# Patient Record
Sex: FEMALE | Race: WHITE | NOT HISPANIC OR LATINO | Employment: OTHER | URBAN - METROPOLITAN AREA
[De-identification: names, ages, dates, MRNs, and addresses within clinical notes are randomized per-mention and may not be internally consistent; named-entity substitution may affect disease eponyms.]

---

## 2020-10-03 PROBLEM — T17.908A ASPIRATION INTO AIRWAY: Status: ACTIVE | Noted: 2020-10-03

## 2020-10-03 PROBLEM — K56.609 SBO (SMALL BOWEL OBSTRUCTION) (HCC): Status: ACTIVE | Noted: 2020-10-03

## 2020-10-15 ENCOUNTER — APPOINTMENT (OUTPATIENT)
Dept: RADIOLOGY | Facility: MEDICAL CENTER | Age: 85
DRG: 380 | End: 2020-10-15
Attending: INTERNAL MEDICINE
Payer: MEDICARE

## 2020-10-15 ENCOUNTER — HOSPITAL ENCOUNTER (OUTPATIENT)
Dept: RADIOLOGY | Facility: MEDICAL CENTER | Age: 85
End: 2020-10-15
Payer: MEDICARE

## 2020-10-15 ENCOUNTER — HOSPITAL ENCOUNTER (INPATIENT)
Facility: MEDICAL CENTER | Age: 85
LOS: 8 days | DRG: 380 | End: 2020-10-23
Attending: INTERNAL MEDICINE | Admitting: INTERNAL MEDICINE
Payer: MEDICARE

## 2020-10-15 DIAGNOSIS — K31.89 GASTRIC MASS: ICD-10-CM

## 2020-10-15 DIAGNOSIS — K31.1 GASTRIC OUTFLOW OBSTRUCTION: ICD-10-CM

## 2020-10-15 PROBLEM — K44.9 HIATAL HERNIA: Status: ACTIVE | Noted: 2020-10-15

## 2020-10-15 PROBLEM — T17.908A ASPIRATION INTO AIRWAY: Status: RESOLVED | Noted: 2020-10-03 | Resolved: 2020-10-15

## 2020-10-15 PROBLEM — K56.609 SBO (SMALL BOWEL OBSTRUCTION) (HCC): Status: RESOLVED | Noted: 2020-10-03 | Resolved: 2020-10-15

## 2020-10-15 PROBLEM — J69.0 ASPIRATION PNEUMONIA (HCC): Status: ACTIVE | Noted: 2020-10-15

## 2020-10-15 LAB
COVID ORDER STATUS COVID19: NORMAL
GLUCOSE BLD-MCNC: 119 MG/DL (ref 65–99)
MAGNESIUM SERPL-MCNC: 1.8 MG/DL (ref 1.5–2.5)

## 2020-10-15 PROCEDURE — 770001 HCHG ROOM/CARE - MED/SURG/GYN PRIV*

## 2020-10-15 PROCEDURE — U0003 INFECTIOUS AGENT DETECTION BY NUCLEIC ACID (DNA OR RNA); SEVERE ACUTE RESPIRATORY SYNDROME CORONAVIRUS 2 (SARS-COV-2) (CORONAVIRUS DISEASE [COVID-19]), AMPLIFIED PROBE TECHNIQUE, MAKING USE OF HIGH THROUGHPUT TECHNOLOGIES AS DESCRIBED BY CMS-2020-01-R: HCPCS

## 2020-10-15 PROCEDURE — 700111 HCHG RX REV CODE 636 W/ 250 OVERRIDE (IP): Performed by: INTERNAL MEDICINE

## 2020-10-15 PROCEDURE — A9270 NON-COVERED ITEM OR SERVICE: HCPCS | Performed by: HOSPITALIST

## 2020-10-15 PROCEDURE — C9113 INJ PANTOPRAZOLE SODIUM, VIA: HCPCS | Performed by: HOSPITALIST

## 2020-10-15 PROCEDURE — 700102 HCHG RX REV CODE 250 W/ 637 OVERRIDE(OP): Performed by: HOSPITALIST

## 2020-10-15 PROCEDURE — 700111 HCHG RX REV CODE 636 W/ 250 OVERRIDE (IP): Performed by: HOSPITALIST

## 2020-10-15 PROCEDURE — 3E02340 INTRODUCTION OF INFLUENZA VACCINE INTO MUSCLE, PERCUTANEOUS APPROACH: ICD-10-PCS | Performed by: INTERNAL MEDICINE

## 2020-10-15 PROCEDURE — 700101 HCHG RX REV CODE 250: Performed by: SURGERY

## 2020-10-15 PROCEDURE — 700101 HCHG RX REV CODE 250: Performed by: HOSPITALIST

## 2020-10-15 PROCEDURE — 82962 GLUCOSE BLOOD TEST: CPT

## 2020-10-15 PROCEDURE — 83735 ASSAY OF MAGNESIUM: CPT | Mod: 91

## 2020-10-15 PROCEDURE — 90662 IIV NO PRSV INCREASED AG IM: CPT | Performed by: INTERNAL MEDICINE

## 2020-10-15 PROCEDURE — 99223 1ST HOSP IP/OBS HIGH 75: CPT | Mod: AI | Performed by: HOSPITALIST

## 2020-10-15 PROCEDURE — 36415 COLL VENOUS BLD VENIPUNCTURE: CPT

## 2020-10-15 PROCEDURE — 90471 IMMUNIZATION ADMIN: CPT

## 2020-10-15 RX ORDER — LEVOTHYROXINE SODIUM 112 UG/1
112 TABLET ORAL
COMMUNITY

## 2020-10-15 RX ORDER — DEXTROSE MONOHYDRATE, SODIUM CHLORIDE, AND POTASSIUM CHLORIDE 50; 1.49; 4.5 G/1000ML; G/1000ML; G/1000ML
INJECTION, SOLUTION INTRAVENOUS CONTINUOUS
Status: DISPENSED | OUTPATIENT
Start: 2020-10-15 | End: 2020-10-16

## 2020-10-15 RX ORDER — LOVASTATIN 20 MG/1
20 TABLET ORAL NIGHTLY
COMMUNITY

## 2020-10-15 RX ORDER — ONDANSETRON 4 MG/1
4 TABLET, ORALLY DISINTEGRATING ORAL EVERY 4 HOURS PRN
Status: DISCONTINUED | OUTPATIENT
Start: 2020-10-15 | End: 2020-10-23 | Stop reason: HOSPADM

## 2020-10-15 RX ORDER — ACETAMINOPHEN 325 MG/1
650 TABLET ORAL EVERY 6 HOURS PRN
Status: DISCONTINUED | OUTPATIENT
Start: 2020-10-15 | End: 2020-10-23 | Stop reason: HOSPADM

## 2020-10-15 RX ORDER — POLYETHYLENE GLYCOL 3350 17 G/17G
1 POWDER, FOR SOLUTION ORAL
Status: DISCONTINUED | OUTPATIENT
Start: 2020-10-15 | End: 2020-10-23 | Stop reason: HOSPADM

## 2020-10-15 RX ORDER — ONDANSETRON 2 MG/ML
4 INJECTION INTRAMUSCULAR; INTRAVENOUS EVERY 4 HOURS PRN
Status: DISCONTINUED | OUTPATIENT
Start: 2020-10-15 | End: 2020-10-23 | Stop reason: HOSPADM

## 2020-10-15 RX ORDER — NIFEDIPINE 30 MG/1
30 TABLET, EXTENDED RELEASE ORAL 2 TIMES DAILY
Status: ON HOLD | COMMUNITY
End: 2020-10-23

## 2020-10-15 RX ORDER — OMEPRAZOLE 20 MG/1
40 CAPSULE, DELAYED RELEASE ORAL DAILY
Status: ON HOLD | COMMUNITY
End: 2020-10-23

## 2020-10-15 RX ORDER — DEXTROSE MONOHYDRATE 25 G/50ML
50 INJECTION, SOLUTION INTRAVENOUS
Status: DISCONTINUED | OUTPATIENT
Start: 2020-10-15 | End: 2020-10-23 | Stop reason: HOSPADM

## 2020-10-15 RX ORDER — AMOXICILLIN 250 MG
2 CAPSULE ORAL 2 TIMES DAILY
Status: DISCONTINUED | OUTPATIENT
Start: 2020-10-15 | End: 2020-10-23 | Stop reason: HOSPADM

## 2020-10-15 RX ORDER — FUROSEMIDE 10 MG/ML
10 INJECTION INTRAMUSCULAR; INTRAVENOUS
Status: DISCONTINUED | OUTPATIENT
Start: 2020-10-16 | End: 2020-10-16

## 2020-10-15 RX ORDER — NYSTATIN 100000 [USP'U]/G
POWDER TOPICAL 2 TIMES DAILY
Status: DISCONTINUED | OUTPATIENT
Start: 2020-10-15 | End: 2020-10-23 | Stop reason: HOSPADM

## 2020-10-15 RX ORDER — LEVOTHYROXINE SODIUM 0.05 MG/1
100 TABLET ORAL
Status: DISCONTINUED | OUTPATIENT
Start: 2020-10-16 | End: 2020-10-23 | Stop reason: HOSPADM

## 2020-10-15 RX ORDER — BISACODYL 10 MG
10 SUPPOSITORY, RECTAL RECTAL
Status: DISCONTINUED | OUTPATIENT
Start: 2020-10-15 | End: 2020-10-23 | Stop reason: HOSPADM

## 2020-10-15 RX ORDER — SODIUM CHLORIDE 0.9 % (FLUSH) 0.9 %
10 SYRINGE (ML) INJECTION 2 TIMES DAILY
Status: DISCONTINUED | OUTPATIENT
Start: 2020-10-15 | End: 2020-10-23 | Stop reason: HOSPADM

## 2020-10-15 RX ORDER — GABAPENTIN 100 MG/1
200 CAPSULE ORAL EVERY EVENING
Status: DISCONTINUED | OUTPATIENT
Start: 2020-10-15 | End: 2020-10-23 | Stop reason: HOSPADM

## 2020-10-15 RX ORDER — PANTOPRAZOLE SODIUM 40 MG/10ML
40 INJECTION, POWDER, LYOPHILIZED, FOR SOLUTION INTRAVENOUS 2 TIMES DAILY
Status: DISCONTINUED | OUTPATIENT
Start: 2020-10-15 | End: 2020-10-23 | Stop reason: HOSPADM

## 2020-10-15 RX ADMIN — INFLUENZA A VIRUS A/MICHIGAN/45/2015 X-275 (H1N1) ANTIGEN (FORMALDEHYDE INACTIVATED), INFLUENZA A VIRUS A/SINGAPORE/INFIMH-16-0019/2016 IVR-186 (H3N2) ANTIGEN (FORMALDEHYDE INACTIVATED), INFLUENZA B VIRUS B/PHUKET/3073/2013 ANTIGEN (FORMALDEHYDE INACTIVATED), AND INFLUENZA B VIRUS B/MARYLAND/15/2016 BX-69A ANTIGEN (FORMALDEHYDE INACTIVATED) 0.7 ML: 60; 60; 60; 60 INJECTION, SUSPENSION INTRAMUSCULAR at 21:15

## 2020-10-15 RX ADMIN — DOCUSATE SODIUM 50 MG AND SENNOSIDES 8.6 MG 2 TABLET: 8.6; 5 TABLET, FILM COATED ORAL at 21:16

## 2020-10-15 RX ADMIN — NYSTATIN: 100000 POWDER TOPICAL at 21:17

## 2020-10-15 RX ADMIN — POTASSIUM CHLORIDE, DEXTROSE MONOHYDRATE AND SODIUM CHLORIDE: 150; 5; 450 INJECTION, SOLUTION INTRAVENOUS at 21:23

## 2020-10-15 RX ADMIN — PANTOPRAZOLE SODIUM 40 MG: 40 INJECTION, POWDER, LYOPHILIZED, FOR SOLUTION INTRAVENOUS at 21:16

## 2020-10-15 RX ADMIN — GABAPENTIN 200 MG: 100 CAPSULE ORAL at 21:17

## 2020-10-15 RX ADMIN — SODIUM CHLORIDE, PRESERVATIVE FREE 10 ML: 5 INJECTION INTRAVENOUS at 21:32

## 2020-10-15 ASSESSMENT — LIFESTYLE VARIABLES
TOTAL SCORE: 0
EVER FELT BAD OR GUILTY ABOUT YOUR DRINKING: NO
EVER HAD A DRINK FIRST THING IN THE MORNING TO STEADY YOUR NERVES TO GET RID OF A HANGOVER: NO
HAVE PEOPLE ANNOYED YOU BY CRITICIZING YOUR DRINKING: NO
HAVE YOU EVER FELT YOU SHOULD CUT DOWN ON YOUR DRINKING: NO
CONSUMPTION TOTAL: NEGATIVE
ALCOHOL_USE: NO
AVERAGE NUMBER OF DAYS PER WEEK YOU HAVE A DRINK CONTAINING ALCOHOL: 0
ON A TYPICAL DAY WHEN YOU DRINK ALCOHOL HOW MANY DRINKS DO YOU HAVE: 0
HOW MANY TIMES IN THE PAST YEAR HAVE YOU HAD 5 OR MORE DRINKS IN A DAY: 0

## 2020-10-15 ASSESSMENT — FIBROSIS 4 INDEX: FIB4 SCORE: 1.5

## 2020-10-15 ASSESSMENT — ENCOUNTER SYMPTOMS
BACK PAIN: 0
VOMITING: 0
PALPITATIONS: 0
MYALGIAS: 0
DOUBLE VISION: 0
BRUISES/BLEEDS EASILY: 0
DIZZINESS: 0
DEPRESSION: 0
CHILLS: 0
NECK PAIN: 0
PND: 0
CLAUDICATION: 0
NAUSEA: 0
HEADACHES: 0
BLURRED VISION: 0
COUGH: 0
FEVER: 0
HEMOPTYSIS: 0
HEARTBURN: 0

## 2020-10-15 ASSESSMENT — COGNITIVE AND FUNCTIONAL STATUS - GENERAL
MOVING FROM LYING ON BACK TO SITTING ON SIDE OF FLAT BED: A LOT
DRESSING REGULAR UPPER BODY CLOTHING: A LITTLE
DRESSING REGULAR LOWER BODY CLOTHING: A LITTLE
DAILY ACTIVITIY SCORE: 17
TOILETING: A LOT
EATING MEALS: A LITTLE
TURNING FROM BACK TO SIDE WHILE IN FLAT BAD: A LOT
STANDING UP FROM CHAIR USING ARMS: A LOT
WALKING IN HOSPITAL ROOM: A LOT
PERSONAL GROOMING: A LITTLE
SUGGESTED CMS G CODE MODIFIER DAILY ACTIVITY: CK
CLIMB 3 TO 5 STEPS WITH RAILING: A LOT
HELP NEEDED FOR BATHING: A LITTLE
MOBILITY SCORE: 12
SUGGESTED CMS G CODE MODIFIER MOBILITY: CL
MOVING TO AND FROM BED TO CHAIR: A LOT

## 2020-10-15 ASSESSMENT — PAIN DESCRIPTION - PAIN TYPE: TYPE: CHRONIC PAIN

## 2020-10-15 ASSESSMENT — PATIENT HEALTH QUESTIONNAIRE - PHQ9
1. LITTLE INTEREST OR PLEASURE IN DOING THINGS: NOT AT ALL
SUM OF ALL RESPONSES TO PHQ9 QUESTIONS 1 AND 2: 0
2. FEELING DOWN, DEPRESSED, IRRITABLE, OR HOPELESS: NOT AT ALL

## 2020-10-15 NOTE — PROGRESS NOTES
2 RN skin check completed with ANGELICA Zaman   Devices in place , central line, SCDs.  Skin assessed under devices:   Skin over bilateral ears intact   Skin over bilateral elbows intact   RLQ abdominal bruising   Redness in perineum, blanching   (R) thigh skin tear   Generalized fragile and flaky skin   Confirmed pressure ulcers found on N/A.  New potential pressure ulcers noted on N/A. Wound consult placed N/A.  The following interventions in place: will place on waffle overlay, q2h repositioning encouraged, educated on mobilization, purewick to be placed to help with incontinence

## 2020-10-15 NOTE — PROGRESS NOTES
Triage officer note    Transferring physician Dr. Deep Ramos    Transferring facility [facility]    Reason for transfer surgery consult    Linda Haley 85 y.o. female admitted to [facility] on September 27, 2020 and she underwent upper GI endoscopy.  She found to have possible gastric outlet obstruction and currently she has been receiving TPN and liquid diet.  She found to have possible hiatal hernia and outside provider would like to transfer this patient for surgery consult.      I discussed this case with Dr. Gisela fish who recommended to notify on-call surgeon on patient arrival.      Please notify surgery on patient arrival

## 2020-10-16 ENCOUNTER — APPOINTMENT (OUTPATIENT)
Dept: RADIOLOGY | Facility: MEDICAL CENTER | Age: 85
DRG: 380 | End: 2020-10-16
Attending: SURGERY
Payer: MEDICARE

## 2020-10-16 PROBLEM — E83.42 HYPOMAGNESEMIA: Status: ACTIVE | Noted: 2020-10-16

## 2020-10-16 PROBLEM — J90 RECURRENT LEFT PLEURAL EFFUSION: Status: ACTIVE | Noted: 2020-10-16

## 2020-10-16 LAB
ALBUMIN SERPL BCP-MCNC: 2.3 G/DL (ref 3.2–4.9)
ALBUMIN/GLOB SERPL: 0.7 G/DL
ALP SERPL-CCNC: 127 U/L (ref 30–99)
ALT SERPL-CCNC: 19 U/L (ref 2–50)
ANION GAP SERPL CALC-SCNC: 7 MMOL/L (ref 7–16)
AST SERPL-CCNC: 19 U/L (ref 12–45)
BILIRUB SERPL-MCNC: 0.5 MG/DL (ref 0.1–1.5)
BUN SERPL-MCNC: 25 MG/DL (ref 8–22)
CALCIUM SERPL-MCNC: 9.6 MG/DL (ref 8.5–10.5)
CHLORIDE SERPL-SCNC: 90 MMOL/L (ref 96–112)
CO2 SERPL-SCNC: 33 MMOL/L (ref 20–33)
CREAT SERPL-MCNC: 0.5 MG/DL (ref 0.5–1.4)
ERYTHROCYTE [DISTWIDTH] IN BLOOD BY AUTOMATED COUNT: 51.1 FL (ref 35.9–50)
GLOBULIN SER CALC-MCNC: 3.3 G/DL (ref 1.9–3.5)
GLUCOSE BLD-MCNC: 100 MG/DL (ref 65–99)
GLUCOSE BLD-MCNC: 126 MG/DL (ref 65–99)
GLUCOSE BLD-MCNC: 83 MG/DL (ref 65–99)
GLUCOSE BLD-MCNC: 96 MG/DL (ref 65–99)
GLUCOSE SERPL-MCNC: 108 MG/DL (ref 65–99)
HCT VFR BLD AUTO: 28.1 % (ref 37–47)
HGB BLD-MCNC: 8.9 G/DL (ref 12–16)
MCH RBC QN AUTO: 31.3 PG (ref 27–33)
MCHC RBC AUTO-ENTMCNC: 31.7 G/DL (ref 33.6–35)
MCV RBC AUTO: 98.9 FL (ref 81.4–97.8)
PLATELET # BLD AUTO: 263 K/UL (ref 164–446)
PMV BLD AUTO: 10.8 FL (ref 9–12.9)
POTASSIUM SERPL-SCNC: 3.8 MMOL/L (ref 3.6–5.5)
PROT SERPL-MCNC: 5.6 G/DL (ref 6–8.2)
RBC # BLD AUTO: 2.84 M/UL (ref 4.2–5.4)
SARS-COV-2 RNA RESP QL NAA+PROBE: NOTDETECTED
SODIUM SERPL-SCNC: 130 MMOL/L (ref 135–145)
SPECIMEN SOURCE: NORMAL
WBC # BLD AUTO: 7.3 K/UL (ref 4.8–10.8)

## 2020-10-16 PROCEDURE — C1751 CATH, INF, PER/CENT/MIDLINE: HCPCS

## 2020-10-16 PROCEDURE — C9113 INJ PANTOPRAZOLE SODIUM, VIA: HCPCS | Performed by: HOSPITALIST

## 2020-10-16 PROCEDURE — 94669 MECHANICAL CHEST WALL OSCILL: CPT

## 2020-10-16 PROCEDURE — 770001 HCHG ROOM/CARE - MED/SURG/GYN PRIV*

## 2020-10-16 PROCEDURE — 99233 SBSQ HOSP IP/OBS HIGH 50: CPT | Performed by: STUDENT IN AN ORGANIZED HEALTH CARE EDUCATION/TRAINING PROGRAM

## 2020-10-16 PROCEDURE — 82962 GLUCOSE BLOOD TEST: CPT

## 2020-10-16 PROCEDURE — 700101 HCHG RX REV CODE 250: Performed by: STUDENT IN AN ORGANIZED HEALTH CARE EDUCATION/TRAINING PROGRAM

## 2020-10-16 PROCEDURE — 74240 X-RAY XM UPR GI TRC 1CNTRST: CPT

## 2020-10-16 PROCEDURE — 700117 HCHG RX CONTRAST REV CODE 255: Performed by: SURGERY

## 2020-10-16 PROCEDURE — 80053 COMPREHEN METABOLIC PANEL: CPT

## 2020-10-16 PROCEDURE — 02HV33Z INSERTION OF INFUSION DEVICE INTO SUPERIOR VENA CAVA, PERCUTANEOUS APPROACH: ICD-10-PCS | Performed by: SURGERY

## 2020-10-16 PROCEDURE — 700101 HCHG RX REV CODE 250: Performed by: SURGERY

## 2020-10-16 PROCEDURE — B548ZZA ULTRASONOGRAPHY OF SUPERIOR VENA CAVA, GUIDANCE: ICD-10-PCS | Performed by: SURGERY

## 2020-10-16 PROCEDURE — 85027 COMPLETE CBC AUTOMATED: CPT

## 2020-10-16 PROCEDURE — 700111 HCHG RX REV CODE 636 W/ 250 OVERRIDE (IP): Performed by: HOSPITALIST

## 2020-10-16 PROCEDURE — 700111 HCHG RX REV CODE 636 W/ 250 OVERRIDE (IP): Performed by: STUDENT IN AN ORGANIZED HEALTH CARE EDUCATION/TRAINING PROGRAM

## 2020-10-16 PROCEDURE — 700102 HCHG RX REV CODE 250 W/ 637 OVERRIDE(OP): Performed by: HOSPITALIST

## 2020-10-16 PROCEDURE — 700105 HCHG RX REV CODE 258: Performed by: STUDENT IN AN ORGANIZED HEALTH CARE EDUCATION/TRAINING PROGRAM

## 2020-10-16 PROCEDURE — 700101 HCHG RX REV CODE 250: Performed by: HOSPITALIST

## 2020-10-16 PROCEDURE — A9270 NON-COVERED ITEM OR SERVICE: HCPCS | Performed by: HOSPITALIST

## 2020-10-16 PROCEDURE — 36415 COLL VENOUS BLD VENIPUNCTURE: CPT

## 2020-10-16 RX ORDER — FUROSEMIDE 10 MG/ML
40 INJECTION INTRAMUSCULAR; INTRAVENOUS ONCE
Status: COMPLETED | OUTPATIENT
Start: 2020-10-16 | End: 2020-10-16

## 2020-10-16 RX ORDER — MAGNESIUM SULFATE HEPTAHYDRATE 40 MG/ML
2 INJECTION, SOLUTION INTRAVENOUS ONCE
Status: COMPLETED | OUTPATIENT
Start: 2020-10-16 | End: 2020-10-16

## 2020-10-16 RX ADMIN — PANTOPRAZOLE SODIUM 40 MG: 40 INJECTION, POWDER, LYOPHILIZED, FOR SOLUTION INTRAVENOUS at 18:15

## 2020-10-16 RX ADMIN — NYSTATIN: 100000 POWDER TOPICAL at 18:00

## 2020-10-16 RX ADMIN — ENOXAPARIN SODIUM 40 MG: 40 INJECTION SUBCUTANEOUS at 05:41

## 2020-10-16 RX ADMIN — GABAPENTIN 200 MG: 100 CAPSULE ORAL at 18:00

## 2020-10-16 RX ADMIN — LEVOTHYROXINE SODIUM 100 MCG: 0.05 TABLET ORAL at 05:40

## 2020-10-16 RX ADMIN — DOCUSATE SODIUM 50 MG AND SENNOSIDES 8.6 MG 2 TABLET: 8.6; 5 TABLET, FILM COATED ORAL at 05:40

## 2020-10-16 RX ADMIN — NYSTATIN: 100000 POWDER TOPICAL at 05:41

## 2020-10-16 RX ADMIN — POTASSIUM CHLORIDE, DEXTROSE MONOHYDRATE AND SODIUM CHLORIDE: 150; 5; 450 INJECTION, SOLUTION INTRAVENOUS at 05:30

## 2020-10-16 RX ADMIN — SODIUM CHLORIDE, PRESERVATIVE FREE 10 ML: 5 INJECTION INTRAVENOUS at 18:09

## 2020-10-16 RX ADMIN — PANTOPRAZOLE SODIUM 40 MG: 40 INJECTION, POWDER, LYOPHILIZED, FOR SOLUTION INTRAVENOUS at 05:26

## 2020-10-16 RX ADMIN — CALCIUM GLUCONATE: 98 INJECTION, SOLUTION INTRAVENOUS at 20:18

## 2020-10-16 RX ADMIN — FUROSEMIDE 40 MG: 10 INJECTION, SOLUTION INTRAMUSCULAR; INTRAVENOUS at 20:18

## 2020-10-16 RX ADMIN — IOHEXOL 200 ML: 240 INJECTION, SOLUTION INTRATHECAL; INTRAVASCULAR; INTRAVENOUS; ORAL at 11:10

## 2020-10-16 RX ADMIN — MAGNESIUM SULFATE 2 G: 2 INJECTION INTRAVENOUS at 18:00

## 2020-10-16 RX ADMIN — FUROSEMIDE 10 MG: 10 INJECTION, SOLUTION INTRAMUSCULAR; INTRAVENOUS at 05:28

## 2020-10-16 RX ADMIN — SODIUM CHLORIDE, PRESERVATIVE FREE 10 ML: 5 INJECTION INTRAVENOUS at 05:53

## 2020-10-16 ASSESSMENT — ENCOUNTER SYMPTOMS
VOMITING: 1
CONSTIPATION: 1
HEADACHES: 0
COUGH: 1
DEPRESSION: 0
NECK PAIN: 0
SHORTNESS OF BREATH: 1
PALPITATIONS: 0
BLURRED VISION: 0
CHILLS: 0
DOUBLE VISION: 0
FEVER: 0
MYALGIAS: 0
DIZZINESS: 0

## 2020-10-16 ASSESSMENT — COPD QUESTIONNAIRES
DURING THE PAST 4 WEEKS HOW MUCH DID YOU FEEL SHORT OF BREATH: SOME OF THE TIME
HAVE YOU SMOKED AT LEAST 100 CIGARETTES IN YOUR ENTIRE LIFE: NO/DON'T KNOW
DO YOU EVER COUGH UP ANY MUCUS OR PHLEGM?: YES, A FEW DAYS A WEEK OR MONTH
COPD SCREENING SCORE: 5

## 2020-10-16 ASSESSMENT — PAIN DESCRIPTION - PAIN TYPE
TYPE: CHRONIC PAIN

## 2020-10-16 ASSESSMENT — LIFESTYLE VARIABLES: EVER_SMOKED: NEVER

## 2020-10-16 NOTE — ASSESSMENT & PLAN NOTE
Chronic normocytic anemia  Iron studies suggestive for anemia of chronic inflammation vs anemia from malignancy  Stable VS  Hemoglobin 7.6->7.3->7.5  Possible slow GI bleed  10/19 EGD w/coffee ground   Transfuse if hemoglobin lower than 7  Labs on AM

## 2020-10-16 NOTE — ASSESSMENT & PLAN NOTE
"-  GI onboard; appreciated.  10/19 EGD showed large amount of retained coffee-ground like debris and solid.  Repeat EGD 10/21 showed esophageal reflux, \"large shallow ulcer involving at least 50% hernia sac, no active bleeding, large adherent mucoid clot.\"    - Gastric emptying study planned for today, 10/22.    - Per GI Carafate slurry 1 g 4 times daily x2 weeks minimum.  Continue Protonix 40 mg p.o. twice daily.  Further recs pending gastric emptying study.  - Follow up biopsies.   - Has not needed TPN since 10/17.  Will remove PICC if no further need.   "

## 2020-10-16 NOTE — H&P
Hospital Medicine History & Physical Note    Date of Service  10/15/2020    Primary Care Physician  Davie Mena M.D.    Consultants  General surgery    Code Status  DNAR/DNI    Chief Complaint  Hiatal hernia    History of Presenting Illness  85 y.o. female who presented 10/15/2020 with past medical history of respiratory failure, aspiration pneumonia, hiatal hernia, patient was admitted at [facility] at the end of September for above condition, patient was found to have probably gastric outlet obstruction secondary to hiatal hernia, patient was evaluated by general surgery patient had EGD that did not show acute findings, patient had upper GI studies that showed low emptying of the hiatal hernia sac and subsequent pooling of contrast within the stomach but follow-through was not completed, patient has been started on TPN she is tolerating clear liquid diet, patient is having bowel movements and passing flatus, apparently [facility] team recommended transfer to [facility] for further evaluation, general surgery has been consulted here and will evaluate patient today, when I saw the patient she is alert oriented follows commands she is in no distress, she denies any nausea vomiting no abdominal pain no diarrhea no constipation, she is tolerating clear liquid diet, she denies any shortness of breath chest pain palpitation focal weakness numbness tingling fever chills, patient will be admitted and will continue monitoring her overnight, will follow up with general surgery recommendations and will need GI consult in a.m., patient expressed understanding of her plan of care and agree with either question have been answered.    Review of Systems  Review of Systems   Constitutional: Negative for chills and fever.   HENT: Negative for congestion and nosebleeds.    Eyes: Negative for blurred vision and double vision.   Respiratory: Negative for cough and hemoptysis.    Cardiovascular:  Negative for chest pain, palpitations, claudication, leg swelling and PND.   Gastrointestinal: Negative for heartburn, nausea and vomiting.   Genitourinary: Negative for hematuria and urgency.   Musculoskeletal: Negative for back pain, myalgias and neck pain.   Skin: Negative for rash.   Neurological: Negative for dizziness and headaches.   Endo/Heme/Allergies: Does not bruise/bleed easily.   Psychiatric/Behavioral: Negative for depression and suicidal ideas.       Past Medical History   has a past medical history of Diabetes, DVT (deep venous thrombosis) (HCC), Hyperlipidemia, Hypertension, Osteoarthritis, and Sleep apnea.    Surgical History   has a past surgical history that includes hip arthroplasty total; knee arthroplasty total; tonsillectomy and adenoidectomy; cholecystectomy (july 2012); knee replacement, total; carpal tunnel release (2/6/2014); gastroscopy-endo (6/12/2014); and gastroscopy (N/A, 10/6/2020).     Family History  No family history of GI problems    Social History   reports that she has never smoked. She does not have any smokeless tobacco history on file. She reports current alcohol use. She reports that she does not use drugs.    Allergies  No Known Allergies    Medications  Prior to Admission Medications   Prescriptions Last Dose Informant Patient Reported? Taking?   Insulin Glargine (LANTUS SC)   Yes No   Sig: Inject 20 Units as instructed every bedtime.   NIFEdipine SR (PROCARDIA-XL) 30 MG tablet   Yes Yes   Sig: Take 30 mg by mouth 2 Times a Day.   acetaminophen 650 MG TABS   Yes No   Sig: Take 650 mg by mouth every four hours as needed for Fever or Mild Pain (Temp greater than 100.5 F or Mild Pain (1-3)).   furosemide (LASIX) 20 MG Tab   Yes No   Sig: Take 20 mg by mouth every day.   gabapentin (NEURONTIN) 100 MG tablet   Yes No   Sig: Take 200 mg by mouth every day.   insulin lispro (HUMALOG) 100 UNIT/ML SOLN   No No   Sig: Inject 3-15 Units as instructed 4 times a day.   levothyroxine  (SYNTHROID) 112 MCG Tab   Yes Yes   Sig: Take 112 mcg by mouth Every morning on an empty stomach.   lovastatin (MEVACOR) 20 MG Tab   Yes Yes   Sig: Take 20 mg by mouth every evening.   omeprazole (PRILOSEC) 20 MG delayed-release capsule   Yes Yes   Sig: Take 40 mg by mouth every day.   oxazepam (SERAX) 10 MG CAPS   Yes No   Sig: Take 10 mg by mouth at bedtime as needed.   potassium chloride (KLOR-CON) 20 MEQ PACK   Yes No   Sig: Take 10 mEq by mouth every day.   psyllium (METAMUCIL) 58.12 % Pack   Yes No   Sig: Take 1 Packet by mouth every day.      Facility-Administered Medications: None       Physical Exam  Temp:  [36.4 °C (97.6 °F)-37.2 °C (99 °F)] 37 °C (98.6 °F)  Pulse:  [] 89  Resp:  [12-18] 18  BP: (121-148)/(64-76) 125/67  SpO2:  [86 %-99 %] 99 %    Physical Exam  Vitals signs and nursing note reviewed.   Constitutional:       Appearance: Normal appearance.   HENT:      Head: Normocephalic.      Nose: Nose normal.      Mouth/Throat:      Mouth: Mucous membranes are moist.      Pharynx: Oropharynx is clear.   Eyes:      General:         Right eye: No discharge.         Left eye: No discharge.      Conjunctiva/sclera: Conjunctivae normal.      Pupils: Pupils are equal, round, and reactive to light.   Neck:      Musculoskeletal: Normal range of motion and neck supple. No neck rigidity or muscular tenderness.   Cardiovascular:      Rate and Rhythm: Normal rate and regular rhythm.      Pulses: Normal pulses.      Comments: Central line  left upper shoulder  Pulmonary:      Effort: Pulmonary effort is normal. No respiratory distress.      Breath sounds: Normal breath sounds. No wheezing.   Abdominal:      General: Bowel sounds are normal. There is no distension.      Palpations: Abdomen is soft.      Tenderness: There is no abdominal tenderness. There is no guarding.   Musculoskeletal: Normal range of motion.         General: No swelling.   Skin:     General: Skin is warm and dry.   Neurological:       General: No focal deficit present.      Mental Status: She is alert and oriented to person, place, and time. Mental status is at baseline.   Psychiatric:         Mood and Affect: Mood normal.         Laboratory:  Recent Labs     10/13/20  0712 10/15/20  0716   WBC 11.5* 8.1   RBC 3.15* 3.01*   HEMOGLOBIN 10.1* 9.5*   HEMATOCRIT 31.4* 29.7*   MCV 99.7* 98.7   MCH 32.1* 31.6*   MCHC 32.2* 32.0*   RDW 14.1 14.1   PLATELETCT 292 243   MPV 10.5* 10.5*     Recent Labs     10/13/20  0712 10/14/20  0930 10/15/20  0716   SODIUM 130* 128* 132*   POTASSIUM 4.2 4.1 3.7   CHLORIDE 96* 94* 94*   CO2 33* 30 30   GLUCOSE 113* 142* 135*   BUN 32* 36* 37*   CREATININE 0.7 0.8 0.9   CALCIUM 9.6 9.7 10.0     Recent Labs     10/13/20  0712 10/14/20  0930 10/15/20  0716   ALTSGPT  --  13 14   ASTSGOT  --  16 16   ALKPHOSPHAT  --  108 113   TBILIRUBIN  --  0.4 0.4   PREALBUMIN  --  13.2*  --    GLUCOSE 113* 142* 135*         No results for input(s): NTPROBNP in the last 72 hours.  Recent Labs     10/14/20  0930   TRIGLYCERIDE 91     No results for input(s): TROPONINT in the last 72 hours.    Imaging:  DX-CHEST-FOR LINE PLACEMENT Perform procedure in: Patient's Room   Final Result      Left subclavian central line identified with tip in expected location of the superior vena cava.      Left pleural fluid and left basilar consolidation.      OUTSIDE IMAGES-CT ABDOMEN /PELVIS   Final Result      OUTSIDE IMAGES-CT ABDOMEN /PELVIS   Final Result      OUTSIDE IMAGES-DX ABDOMEN   Final Result      [id number] FILM X-RAY   Final Result      OUTSIDE IMAGES-US VASCULAR   Final Result      OUTSIDE IMAGES-DX ABDOMEN   Final Result            Assessment/Plan:  I anticipate this patient will require at least two midnights for appropriate medical management, necessitating inpatient admission.    * Gastric outflow obstruction- (present on admission)  Assessment & Plan  Patient had barium swallow evaluation that showed barium transits the esophagus  in a rapid fashion. There is no evidence of stricture. There is evidence of frequent esophageal spasm noted throughout the distal two thirds of the esophagus. There is a hiatal hernia at the GE junction, which measures approximately 5 cm in diameter. Contrast enters the hiatal hernia rapidly. After mild delay, small amounts of barium can be seen exiting the hiatal hernia, and passing distally into the subdiaphragmatic stomach  Apparently surgery recommended transfer to [facility] for surgery/GI evaluation, surgery has been consulted, will ask GI consult in a.m., at this time patient is tolerating clear liquid diet without nausea or vomiting, patient is passing flatus, denies any abdominal pain, she is not in acute distress.    Anemia- (present on admission)  Assessment & Plan  Hemoglobin 9.5, continue close monitoring.    GI bleed- (present on admission)  Assessment & Plan  Resolved, patient received endoscopy at outside facility, continue IV PPI    Aspiration pneumonia (HCC)- (present on admission)  Assessment & Plan  Probably due to gastric obstruction, patient is tolerating clear liquid diet and she is on TPN at this time.  No signs of active infection    Hiatal hernia- (present on admission)  Assessment & Plan  She has been evaluated by surgery at outside facility recommending transfer to [facility] for surgery/GI evaluation.      DVT prophylaxis Lovenox

## 2020-10-16 NOTE — PROGRESS NOTES
Hospital Medicine Daily Progress Note    Date of Service  10/16/2020    Chief Complaint  85 y.o. female admitted 10/15/2020 with vomiting and possible gastric outlet obstruction    Hospital Course    50-year-old female with a past medical history of hypothyroidism, diabetes mellitus, GI bleed, gastritis, normocytic anemia, respiratory failure, aspiration pneumonia, and hiatal hernia was transferred from [facility] for potential gastric outlet obstruction secondary to hiatal hernia requiring total parenteral nutrition.       Interval Problem Update  Patient evaluated at bedside and found AAOX4, afebrile and in no acute distress  Stable vitals, patient saturating well on 2 L nasal cannula  CBC with normocytic anemia with high RDW suggestive of iron deficiency anemia  Repeat iron studies  DC central line, place PICC  Upper GI small bowel without obstruction but she was noted for moderate thoracic esophageal dysmotility  Gastroenterology following, appreciate recommendations  General surgery following, appreciate recommendations  SLP/PT/OT    Consultants/Specialty  General surgery  Gastroenterology    Code Status  DNAR/DNI    Disposition  Inpatient    Review of Systems  Review of Systems   Constitutional: Positive for malaise/fatigue. Negative for chills and fever.   HENT: Negative for hearing loss and tinnitus.    Eyes: Negative for blurred vision and double vision.   Respiratory: Positive for cough and shortness of breath.    Cardiovascular: Negative for chest pain and palpitations.   Gastrointestinal: Positive for constipation and vomiting.   Genitourinary: Negative for dysuria and urgency.   Musculoskeletal: Negative for myalgias and neck pain.   Skin: Negative for itching and rash.   Neurological: Negative for dizziness and headaches.   Psychiatric/Behavioral: Negative for depression and suicidal ideas.        Physical Exam  Temp:  [36.3 °C (97.4 °F)-37 °C (98.6 °F)] 36.3 °C (97.4 °F)  Pulse:  [84-95] 87  Resp:   [12-20] 20  BP: (125-148)/(67-84) 129/70  SpO2:  [86 %-99 %] 98 %    Physical Exam  Cardiovascular:      Rate and Rhythm: Normal rate and regular rhythm.   Pulmonary:      Effort: Pulmonary effort is normal.         Fluids    Intake/Output Summary (Last 24 hours) at 10/16/2020 0754  Last data filed at 10/16/2020 0400  Gross per 24 hour   Intake 217.18 ml   Output 300 ml   Net -82.82 ml       Laboratory  Recent Labs     10/15/20  0716 10/16/20  0645   WBC 8.1 7.3   RBC 3.01* 2.84*   HEMOGLOBIN 9.5* 8.9*   HEMATOCRIT 29.7* 28.1*   MCV 98.7 98.9*   MCH 31.6* 31.3   MCHC 32.0* 31.7*   RDW 14.1 51.1*   PLATELETCT 243 263   MPV 10.5* 10.8     Recent Labs     10/14/20  0930 10/15/20  0716 10/16/20  0645   SODIUM 128* 132* 130*   POTASSIUM 4.1 3.7 3.8   CHLORIDE 94* 94* 90*   CO2 30 30 33   GLUCOSE 142* 135* 108*   BUN 36* 37* 25*   CREATININE 0.8 0.9 0.50   CALCIUM 9.7 10.0 9.6             Recent Labs     10/14/20  0930   TRIGLYCERIDE 91       Imaging  IR-PICC LINE PLACEMENT W/ GUIDANCE > AGE 5   Final Result                  Ultrasound-guided PICC placement performed by qualified nursing staff as    above.          DX-UPPER GI-SMALL BOWEL FOLLOW THRU   Final Result      1.  Negative small bowel follow-through. No obstruction      2.  Moderate thoracic esophageal dysmotility and the esophagus is mildly dilated.      DX-CHEST-FOR LINE PLACEMENT Perform procedure in: Patient's Room   Final Result      Left subclavian central line identified with tip in expected location of the superior vena cava.      Left pleural fluid and left basilar consolidation.      OUTSIDE IMAGES-CT ABDOMEN /PELVIS   Final Result      OUTSIDE IMAGES-CT ABDOMEN /PELVIS   Final Result      OUTSIDE IMAGES-DX ABDOMEN   Final Result      [id number] FILM X-RAY   Final Result      OUTSIDE IMAGES-US VASCULAR   Final Result      OUTSIDE IMAGES-DX ABDOMEN   Final Result           Assessment/Plan  * Gastric outflow obstruction- (present on  admission)  Assessment & Plan  Transfer to [facility] for surgery/GI evaluation for possible bowel obstruction and possible gastric outlet obstruction secondary to hiatal hernia   Keep n.p.o. for now, continue TPN  Speech eval  10/16 Upper GI small bowel without obstruction but she was noted for moderate thoracic esophageal dysmotility  Gastroenterology following, appreciate recommendations  General surgery following, appreciate recommendations    Anemia- (present on admission)  Assessment & Plan  Chronic normocytic anemia with high RDW  Stable VS  Hemoglobin trending down slowly  Repeat iron studies  Transfuse if hemoglobin lower than 7  Labs on AM    GI bleed- (present on admission)  Assessment & Plan  Hb trending down slowly  History of GI bleed at outside facility  Continue IV PPI for now  Repeat iron studies and H&H  Transfuse if hemoglobin lower than 7  GI following, appreciate recommendations  Labs in AM    Recurrent left pleural effusion  Assessment & Plan  Noted on outside Chest CT 10 days ago and on 10/15 CXR  Completed IV antibiotic for aspiration pneumonia at outside facility  10/2020 ECHO w/EF 70%  Patient saturating well on 2 L nasal cannula  Likely secondary to atelectasis impression from large hiatal hernia  Procal and BNP    Hypomagnesemia  Assessment & Plan  Replete as necessary    Aspiration pneumonia (HCC)- (present on admission)  Assessment & Plan  Completed antibiotic regimen at outside facility, will stop to be due to possible gastric obstruction  No signs of active infection  Keep n.p.o. for now  Continue to TPN for now  SPL eval    Hiatal hernia- (present on admission)  Assessment & Plan  She has been evaluated by surgery at outside facility recommending transfer to [facility] for surgery/GI evaluation  General surgery following, appreciate recommendations       VTE prophylaxis: SCDs

## 2020-10-16 NOTE — PROGRESS NOTES
Pharmacy TPN Day # 1       10/16/2020    Dosing Weight   58.8 kg     TPN currently providing 50% of goal  TPN goal: 8666-2720 kcal/day including 1.2-1.5 gm/kg/day Protein  TPN indication: SBO         Pertinent PMH: 86 y/o F transferred from [facility] for complex issues relating to possible bowel obstruction and possible gastric outlet obstruction secondary to hiatal hernia.  Patient was admitted near the end of September with what was thought to be a mechanical bowel obstruction.   Work-up included upper endoscopy which discovered gastritis and somewhat difficult to traverse hiatal hernia.  Upper GI showed slow emptying of the hiatal hernia sac and subsequent pooling of contrast within the stomach but follow-through was not completed.      Temp (24hrs), Av.7 °C (98.1 °F), Min:36.3 °C (97.4 °F), Max:37 °C (98.6 °F)  .  Recent Labs     10/14/20  0930 10/15/20  0716 10/15/20  1945 10/16/20  0645   SODIUM 128* 132*  --  130*   POTASSIUM 4.1 3.7  --  3.8   CHLORIDE 94* 94*  --  90*   CO2 30 30  --  33   BUN 36* 37*  --  25*   CREATININE 0.8 0.9  --  0.50   GLUCOSE 142* 135*  --  108*   CALCIUM 9.7 10.0  --  9.6   ASTSGOT 16 16  --  19   ALTSGPT 13 14  --  19   ALBUMIN 1.9* 1.8*  --  2.3*   TBILIRUBIN 0.4 0.4  --  0.5   PHOSPHORUS 2.8 3.0  --   --    MAGNESIUM 2.0 1.9 1.8  --    PREALBUMIN 13.2*  --   --   --      Accu-Checks  Recent Labs     10/15/20  1233 10/15/20  2208 10/16/20  0549   POCGLUCOSE 110* 119* 100*       Vitals:    10/15/20 1930 10/16/20 0000 10/16/20 0400 10/16/20 0709   BP: 140/72 132/84 129/70 117/64   Weight:       Height:           Intake/Output Summary (Last 24 hours) at 10/16/2020 1144  Last data filed at 10/16/2020 0709  Gross per 24 hour   Intake 217.18 ml   Output 1000 ml   Net -782.82 ml       Orders Placed This Encounter   Procedures   • Diet NPO     Standing Status:   Standing     Number of Occurrences:   8     Order Specific Question:   Restrict to:     Answer:   Sips  with Medications [3]         TPN for past 72 hours (Show up to 3 orders; newest on the left.)     Start date and time   10/16/2020 2000      TPN Central Line Formulation [[id number]]    Order Status  Active       Base    Clinisol 15%  45 g    dextrose  83 g    fat emulsions 20%  19 g       Additives    potassium phosphate  15 mmol    potassium chloride  53 mEq    sodium acetate  96 mEq    sodium chloride  180 mEq    magnesium sulfate  16 mEq    calcium GLUConate  4.65 mEq    M.T.E. -5 Adult  1 mL    M.V.I. Adult  10 mL       QS Base    sterile water for inj(pf)  1,136.96 mL       Energy Contribution    Proteins  --    Dextrose  --    Lipids  --    Total  --       Electrolyte Ion Calculated Amount    Sodium  276 mEq    Potassium  75 mEq    Calcium  4.65 mEq    Magnesium  16 mEq    Aluminum  --    Phosphate  15 mmol    Chloride  233 mEq    Acetate  134.1 mEq       Other    Total Protein  45 g    Total Protein/kg  0.63 g/kg    Total Amino Acid  --    Total Amino Acid/kg  --    Glucose Infusion Rate  0.8 mg/kg/min    Osmolarity (Estimated)  --    Volume  1,800 mL    Rate  75 mL/hr    Dosing Weight  71.9 kg    Infusion Site  Central            This formula provides:  % kcal as lipids = 29  Grams protein/kg = 0.77  Non-protein calories = 472  Kcals/kg = 11  Total daily calories = 652     Comments:  · TPN initiated @50% goal.  · D/w MD, add 2g Mg rider iv x1   · Diet :CLD , NPO at midnight  · Per RN note: + Voids per purewick d/t incontinence. + flatus.     Fluids:  · TPN: 31mL/kg day  · MIVF: D5W 0.45%NSw20K @ 83mL/hr . Will ask to dc MIVF when TPN starts.  · D/w MD , discontinue MIVF when TPN starts      Macronutrients:  · CHO 43% AA 28%Lipids 29%  · GIR 1.05 mg/kg min  · Accuchecks: 100-119     Micronutrients:  · Na 276meq  · K 75meq  · Ca 4.65meq  · Mg 16meq  · Phos 15meq        Davie GarciaD BCPS

## 2020-10-16 NOTE — PROGRESS NOTES
Direct admission from [facility].   Dr. Payne paged to bedside for direct admission.     Pt is A&Ox4.   Denies pain.   Generalized weakness. Requires x 2 to max assist to reposition in bed. Pt utilizes walker at baseline.   On 2L O2 NC, denies SOB or chest pain; + dry, non-productive cough. COVID test sent to lab.   Normoactive BS x 4. Pt tolerating clear liquid diet. Denies nausea/vomiting.   + flatus. Pt reports having a BM prior to leaving [facility].   + void. Pt is incontinent of urine. Purewick placed.   2 RN skin check completed.   (L) chest subclavian triple lumen in place. STAT CXR ordered to verify appropriate placement.   Updated on POC. Belongings and call light within reach. All needs met at this time.   Bed alarm on for safety.

## 2020-10-16 NOTE — PROGRESS NOTES
Assumed care of pt at 0745. Report received and bedside rounding completed with night RN. Pt is calm no SOB, or any acute distress noted.    Fall precautions in place,  bed alarm. - Treaded non slip socks. Bed locked. Communication board updated with POC. Call light and pt belongings within reach - pt makes needs known - hourly rounding in place. See flowsheets for further assessment.

## 2020-10-16 NOTE — PROGRESS NOTES
TPN will be assessed for initiation on 10/16 at 2000.  FSBS monitoring q8h.  If patient's FSBS </= 70 mg/dL consider initiation of D10W pending initiation of TPN.    James Guerrero, RadhaD

## 2020-10-16 NOTE — CONSULTS
Gastroenterology Consultation    Date of Service  10/16/2020    Referring Physician  Rikki Coe*    Consulting Physician  Alejandro Chapin M.D.    Reason for Consultation  Vomiting    History of Presenting Illness  85 y.o. female with HTN, hyperlipidemia, DM who was transferred from [facility] 10/15/2020 for vomiting and concern for gastric outlet obstruction and small bowel obstruction.    Most of history obtained from review of chart and she is poor historian overall.    She apparently initially presented with nausea and vomiting.  Initially felt to have possible small bowel obstruction treated conservatively.  Still had persistent vomiting, unclear if related to oral intake or even occurred without eating.  Additional evaluation with EGD showed retained fluid and food within esophagus and hiatal hernia (almost 600 mL) per report as well as possible slight narrowing of pylorus.  Gastric biopsies negative for H pylori.  Difficult to maneuver scope through hiatal hernia.  UGI series showed slow emptying of hiatal hernia sac but no SBFT performed.  She has been on TPN  But is also taking clear liquids per report.  She denies issues currently.  She is having BMs per her.  Denies abdominal pain.    She has been seen by surgery and is currently undergoing UGI SBFT.    She did have EGD 2015 showing 5 cm HH with [place] erosions and MW tear.    Review of Systems  Review of Systems   Constitutional: Positive for malaise/fatigue.   Gastrointestinal: Positive for constipation and vomiting.   All other systems reviewed and are negative.      Past Medical History   has a past medical history of Diabetes, DVT (deep venous thrombosis) (HCC), Hyperlipidemia, Hypertension, Osteoarthritis, and Sleep apnea.    Surgical History   has a past surgical history that includes hip arthroplasty total; knee arthroplasty total; tonsillectomy and adenoidectomy; cholecystectomy (july 2012); knee replacement, total; carpal tunnel  release (2/6/2014); gastroscopy-endo (6/12/2014); and gastroscopy (N/A, 10/6/2020).    Family History  family history is not on file.    Social History   reports that she has never smoked. She does not have any smokeless tobacco history on file. She reports current alcohol use. She reports that she does not use drugs.    Medications    Current Facility-Administered Medications:   •  alteplase  •  alteplase  •  alteplase  •  TPN Central Line Formulation  •  magnesium sulfate  •  gabapentin  •  levothyroxine  •  MD Alert...TPN per Pharmacy  •  normal saline flush  •  nystatin  •  pantoprazole  •  furosemide  •  senna-docusate **AND** polyethylene glycol/lytes **AND** magnesium hydroxide **AND** bisacodyl  •  Respiratory Therapy Consult  •  enoxaparin  •  acetaminophen  •  ondansetron  •  ondansetron  •  insulin regular **AND** POC Blood Glucose **AND** NOTIFY MD and PharmD **AND** glucose **AND** dextrose 50%  •  dextrose 5 % and 0.45 % NaCl with KCl 20 mEq      Allergies  No Known Allergies    Physical Exam  Temp:  [36.3 °C (97.4 °F)-37 °C (98.6 °F)] 36.6 °C (97.9 °F)  Pulse:  [76-95] 94  Resp:  [17-20] 18  BP: (117-140)/(64-84) 117/64  SpO2:  [95 %-99 %] 98 %    Physical Exam  Vitals signs and nursing note reviewed.   Constitutional:       General: She is not in acute distress.     Appearance: Normal appearance. She is normal weight. She is not ill-appearing or toxic-appearing.   HENT:      Head: Normocephalic and atraumatic.      Mouth/Throat:      Mouth: Mucous membranes are dry.      Pharynx: Oropharynx is clear. No oropharyngeal exudate or posterior oropharyngeal erythema.   Eyes:      General: No scleral icterus.     Extraocular Movements: Extraocular movements intact.      Conjunctiva/sclera: Conjunctivae normal.      Pupils: Pupils are equal, round, and reactive to light.   Neck:      Musculoskeletal: Normal range of motion and neck supple.   Cardiovascular:      Rate and Rhythm: Normal rate and regular  rhythm.      Pulses: Normal pulses.      Heart sounds: Normal heart sounds.   Pulmonary:      Effort: Pulmonary effort is normal. No respiratory distress.      Breath sounds: Normal breath sounds. No stridor. No wheezing, rhonchi or rales.   Abdominal:      General: Abdomen is flat. Bowel sounds are normal. There is no distension.      Palpations: Abdomen is soft. There is no mass.      Tenderness: There is no abdominal tenderness. There is no guarding or rebound.      Hernia: No hernia is present.   Musculoskeletal:      Right lower leg: Edema present.      Left lower leg: Edema present.   Skin:     General: Skin is warm and dry.      Coloration: Skin is not jaundiced.   Neurological:      General: No focal deficit present.      Mental Status: She is alert and oriented to person, place, and time.   Psychiatric:         Mood and Affect: Mood normal.         Behavior: Behavior normal.         Fluids  Date 10/16/20 0700 - 10/17/20 0659   Shift 6889-6739 9886-2011 4654-2874 24 Hour Total   INTAKE   Shift Total       OUTPUT   Urine 700   700   Shift Total 700   700   Weight (kg) 71.9 71.9 71.9 71.9       Laboratory  Recent Labs     10/15/20  0716 10/16/20  0645   WBC 8.1 7.3   RBC 3.01* 2.84*   HEMOGLOBIN 9.5* 8.9*   HEMATOCRIT 29.7* 28.1*   MCV 98.7 98.9*   MCH 31.6* 31.3   MCHC 32.0* 31.7*   RDW 14.1 51.1*   PLATELETCT 243 263   MPV 10.5* 10.8     Recent Labs     10/14/20  0930 10/15/20  0716 10/16/20  0645   SODIUM 128* 132* 130*   POTASSIUM 4.1 3.7 3.8   CHLORIDE 94* 94* 90*   CO2 30 30 33   GLUCOSE 142* 135* 108*   BUN 36* 37* 25*   CREATININE 0.8 0.9 0.50   CALCIUM 9.7 10.0 9.6              Recent Labs     10/14/20  0930   TRIGLYCERIDE 91        Imaging  DX-CHEST-FOR LINE PLACEMENT Perform procedure in: Patient's Room   Final Result      Left subclavian central line identified with tip in expected location of the superior vena cava.      Left pleural fluid and left basilar consolidation.      OUTSIDE IMAGES-CT  ABDOMEN /PELVIS   Final Result      OUTSIDE IMAGES-CT ABDOMEN /PELVIS   Final Result      OUTSIDE IMAGES-DX ABDOMEN   Final Result      [id number] FILM X-RAY   Final Result      OUTSIDE IMAGES-US VASCULAR   Final Result      OUTSIDE IMAGES-DX ABDOMEN   Final Result      DX-UPPER GI-SMALL BOWEL FOLLOW THRU    (Results Pending)   IR-PICC LINE PLACEMENT W/ GUIDANCE > AGE 5    (Results Pending)         Assessment/Plan  85 year-old with multiple medical problems that was transferred from [facility]SH for evaluation of vomiting and possible gastric outlet obstruction.  Unclear whether she has delayed emptying due to hiatal hernia with obstruction or simply poor motility overall.  Agree with UGI SBFT for now.  Surgery considering possible HH repair.    PROBLEMS:  1. Nausea and vomiting  2. Abnormal UGI series  3. Hiatal hernia  4. Possible recent small bowel obstruction  5. Chronic normocytic anemia  6. Hypertension  7. Hyperlipidemia  8. Diabetes mellitus  9. Protein-calorie malnutrition, moderate    PLAN:  1. Await UGI SBFT results for now

## 2020-10-16 NOTE — ASSESSMENT & PLAN NOTE
- Completed antibiotic regimen at outside facility.  Further abx has been stopped due to possible gastric outlet obstruction.   - No signs of active infection.  Monitor.    - SLP onboard; appreciated.

## 2020-10-16 NOTE — ASSESSMENT & PLAN NOTE
- Patient evaluated by Surgery at outside facility, who recommended transfer to [facility] for Surgery/GI evaluation.   - General Surgery onboard; appreciated.

## 2020-10-16 NOTE — DIETARY
"Nutrition Support Assessment: Linda Haley is a 85 y.o. female with admitting DX of vomiting, possible gastric outlet obstruction secondary to hiatal hernia      Current problem list:  1. Pt on clears + TPN PTA  2. Anemia  3. GIB  4. Aspiration pneumonia  5. May need surgery for hiatal hernia repair     Assessment:  Estimated Nutritional Needs: based on:   Height: 157.5 cm   Weight: 71.9 kg   Weight to Use in Calculations: 71.9 kg   Body mass index is 28.98 kg/m².  Pertinent Medical History: respiratory failure, aspiration pneumonia, hiatal hernia, DM, hyperlipidemia, HTN, sleep apnea, osteoarthritis     Calculation/Equation: MSJ x (1.0 - 1.1) = 1119 - 1231  Total Calories / day: 1100 - 1200 (Calories / kg: 15 - 17)  Total Grams Protein / day: 72 - 86 (Grams Protein / k.0 - 1.2)     Evaluation:   1. Pt was on TPN PTA, she is tolerating clear liquid diet with +flatus and +BM  2. SLP was unable to assess pt d/t NPO for GI tests. Per MD note, \"recent EGD demonstrated old oral contrast within the intrathoracic portion of her stomach suggestive of incomplete emptying of her supradiaphragmatic stomach\".  UGI with SBFT was negative for obstruction, showed moderate thoracic esophageal dysmotility and the esophagus is mildly dilated. Unsure if TPN remains appropriate, awaiting GI MD recs  3. Old central line was d/c'd, pt got a PICC today   4. TPN will begin today at 75 mL/hr and provide 652 kcals and 0.77 gm pro/kg/d (dosing wt 58.8 kg).   5. Unsure if admit bed scale accurate as pt was weighing 82 - 94 kg at previous facility.  Stand up scale wt would be helpful.  Pt is on lasix    6. Labs: Na+ 130, glu 108, BUN 25, alk phos 127  7. Last BM: smear today  8. Meds/fluids: lasix, SSI, synthroid, mag sulfate (once)      Malnutrition Risk: unable to determine at this time, RD was not able to see pt today.  Pt was 79.4 kg in Dec of 2019 and ~90 kg when admitted to other facility on 20.  Per nutrition admit screen, " pt has lost 14-23# the past month but per notes pt is a poor historian.  Per GI MD note, pt with moderate protein calorie malnutrition.       Recommendations/Plan:  1. TPN per RPh; however, unsure if it is still appropriate.  Awaiting GI MD recs and whether pt will have surgery.  Could have SLP assess for po diet, pt may be able to tolerate full liquids with Boost supplement.   2. RD will monitor nutrition parameters.  3. Please attempt stand up scale if feasible.

## 2020-10-16 NOTE — PROCEDURES
Vascular Access Team     Date of Insertion: 10/16/20  Arm Circumference: 31  Internal length: 37  External Length: 0  Vein Occupancy %: 45   Reason for PICC: TPN  Labs: WBC 7.3, , BUN 25, Cr 0.50, GFR >60, INR NA     Consents confirmed, vessel patency confirmed with ultrasound. Risks and benefits of procedure explained to patient and education regarding central line associated bloodstream infections provided. Questions answered.      PICC placed in RUE per licensed provider order with ultrasound guidance.  5 Fr, Double lumen PICC placed in Brachial vein after 1 attempt(s). 2 mL of 1% lidocaine injected intradermally, 21 gauge microintroducer needle and modified Seldinger technique used. 37 cm catheter inserted with good blood return. Secured at 0 cm marker. Each lumen flushed without resistance with 10 mL 0.9% normal saline. PICC line secured with Biopatch and Tegaderm.     PICC tip placement location is confirmed by nurse to be in the Superior Vena Cava (SVC) utilizing 3CG technology. PICC line is appropriate for use at this time. Patient tolerated procedure well, without complications.  Patient condition relayed to unit RN or ordering physician via this post procedure note in the EMR.      Ultrasound images uploaded to PACS and viewable in the EMR - yes  Ultrasound imaged printed and placed in paper chart - no     BARD Power PICC ref # [id number], Lot # [id number], Expiration Date 5/31/21

## 2020-10-16 NOTE — ASSESSMENT & PLAN NOTE
"- With anemia.  Hgb remains stable.  Transfuse 1 unit packed RBCs if Hgb < 7 or patient symptomatic.  - GI onboard; appreciated.  S/p EGD 10/21 showed esophageal reflux, \"large shallow ulcer involving at least 50% hernia sac, no active bleeding, large adherent mucoid clot.\"    - Gastric emptying study planned for today, 10/22.    - Per GI Carafate slurry 1 g 4 times daily x2 weeks minimum.  Continue Protonix 40 mg p.o. twice daily.  Further recs pending gastric emptying study.  - Monitor CBC.   "

## 2020-10-16 NOTE — PROGRESS NOTES
Surgery General Daily Progress Note    Date of Service  10/16/2020    Chief Complaint  85 y.o. female admitted 10/15/2020 with poor PO intake, gastric dysmotility and hiatal hernia. She has been on TPN recently for poor PO intake. Transferred here last night for consideration of hiatal hernia repair and nissen. UGI ordered and performed this morning, but recent EGD demonstrated old oral contrast within the intrathoracic portion of her stomach suggestive of incomplete emptying of her supradiaphragmatic stomach. She has no current complaints of abdominal or chest pain      Physical Exam  Temp:  [36.3 °C (97.4 °F)-37 °C (98.6 °F)] 36.6 °C (97.9 °F)  Pulse:  [76-95] 94  Resp:  [17-20] 18  BP: (117-140)/(64-84) 117/64  SpO2:  [95 %-99 %] 98 %    Physical Exam  NAD, appears well  Neck supple, no crepitus  Normal respiratory effort  Regular heart rate  Abdomen soft, ND, nontender  EXT ROM grossly intact  Skin pink warm and dry    Fluids    Intake/Output Summary (Last 24 hours) at 10/16/2020 1329  Last data filed at 10/16/2020 0709  Gross per 24 hour   Intake 217.18 ml   Output 1000 ml   Net -782.82 ml       Laboratory  Recent Labs     10/15/20  0716 10/16/20  0645   WBC 8.1 7.3   RBC 3.01* 2.84*   HEMOGLOBIN 9.5* 8.9*   HEMATOCRIT 29.7* 28.1*   MCV 98.7 98.9*   MCH 31.6* 31.3   MCHC 32.0* 31.7*   RDW 14.1 51.1*   PLATELETCT 243 263   MPV 10.5* 10.8     Recent Labs     10/14/20  0930 10/15/20  0716 10/16/20  0645   SODIUM 128* 132* 130*   POTASSIUM 4.1 3.7 3.8   CHLORIDE 94* 94* 90*   CO2 30 30 33   GLUCOSE 142* 135* 108*   BUN 36* 37* 25*   CREATININE 0.8 0.9 0.50   CALCIUM 9.7 10.0 9.6             Recent Labs     10/14/20  0930   TRIGLYCERIDE 91       Imaging  UGI series with gastrograffin- pending    Assessment/Plan  84 y/o female with likely obstructing hiatal hernia in addition to poor gastric emptying, no incarceration or strangulation  1. Appreciate medical care  2. Medical/cardiac optimization and clearance for  surgery  3. dvt prophylaxis  4. NPO  5. DC central line (in for 2 weeks)  6. PICC today  7. Continue TPN  8. Weekly nutrition labs  9. F/u UGI  10 will discuss surgical options with family

## 2020-10-16 NOTE — CONSULTS
Surgical consultation    Chief Complaint: Hiatal hernia.     Consult requested by: Dr Yeager from the hospitalist service    History of Present Illness: The patient is an 85 year-old White elderly woman who was transferred from [facility] for complex issues relating to possible bowel obstruction and possible gastric outlet obstruction secondary to hiatal hernia.  Patient was admitted near the end of September with what was thought to be a mechanical bowel obstruction and was quite dehydrated at that time.  She was resuscitated and her symptoms seem to improve however she continued to have nausea and vomiting.  Work-up included upper endoscopy which discovered gastritis and somewhat difficult to traverse hiatal hernia.  Upper GI showed slow emptying of the hiatal hernia sac and subsequent pooling of contrast within the stomach but follow-through was not completed.  Patient has been on TPN and has recently been tolerating a clear liquid diet and having bowel movements.  Patient was transferred for further surgical and medical evaluation and management.  Patient seems fine at this point.  She denies overt symptoms but says she does have mild abdominal pain.  She has no nausea or vomiting today.  She has been taking clears.  She has no chest pain or shortness of breath.      Past Medical History:  has a past medical history of Diabetes, DVT (deep venous thrombosis) (HCC), Hyperlipidemia, Hypertension, Osteoarthritis, and Sleep apnea.    Past Surgical History:  has a past surgical history that includes hip arthroplasty total; knee arthroplasty total; tonsillectomy and adenoidectomy; cholecystectomy (july 2012); knee replacement, total; carpal tunnel release (2/6/2014); gastroscopy-endo (6/12/2014); and gastroscopy (N/A, 10/6/2020).    Allergies: No Known Allergies    Current Medications:    Home Medications     Reviewed by Talya Rice R.N. (Registered Nurse) on 10/15/20 at 1618  Med List Status:  "Partial   Medication Last Dose Status   acetaminophen 650 MG TABS  Active   furosemide (LASIX) 20 MG Tab  Active   gabapentin (NEURONTIN) 100 MG tablet  Active   Insulin Glargine (LANTUS SC)  Active   insulin lispro (HUMALOG) 100 UNIT/ML SOLN  Active   levothyroxine (SYNTHROID) 112 MCG Tab  Active   lovastatin (MEVACOR) 20 MG Tab  Active   NIFEdipine SR (PROCARDIA-XL) 30 MG tablet  Active   omeprazole (PRILOSEC) 20 MG delayed-release capsule  Active   oxazepam (SERAX) 10 MG CAPS  Active   potassium chloride (KLOR-CON) 20 MEQ PACK  Active   psyllium (METAMUCIL) 58.12 % Pack  Active                Family History: family history is not on file.    Social History:  reports that she has never smoked. She does not have any smokeless tobacco history on file. She reports current alcohol use. She reports that she does not use drugs.    Review of Systems: Review of systems is remarkable for the following Gastrointestinal obstructive symptoms with nausea and vomiting. The remainder of the comprehensive ROS is negative with the exception of the aforementioned HPI, PMH, and PSH bullets in accordance with CMS guideline.    Physical Examination:     Constitutional:     Vital Signs: /67   Pulse 89   Temp 37 °C (98.6 °F) (Temporal)   Resp 18   Ht 1.575 m (5' 2\")   Wt 71.9 kg (158 lb 8.2 oz)   SpO2 99%    General Appearance: The patient is a pleasant  and cooperative elderly woman in no critical distress.  She is obese  HEENT:    Pale without jaundice or icterus.  Mucous membranes moist  Neck:    Supple  Respiratory:   Inspection: Unlabored respirations, no intercostal retractions, paradoxical motion, or accessory muscle use.   Auscultation: Diminished bases but otherwise clear to auscultation.  Cardiovascular:   Inspection: The skin is warm and well purfused.  Auscultation: Regular rate and rhythm.   Peripheral Pulses: Normal.   Abdomen:   Inspection: Abdominal inspection reveals no obvious scars or hernias.   Palpation: " Palpation is remarkable for Minimal epigastric bloating and tenderness  Musculoskeletal:   No cyanosis edema or deformity  Skin:    Examination of the skin reveals pallor  Neurologic:   Neurologic examination reveals no focal deficits noted.    Laboratory Values:   Recent Labs     10/13/20  0712 10/15/20  0716   WBC 11.5* 8.1   RBC 3.15* 3.01*   HEMOGLOBIN 10.1* 9.5*   HEMATOCRIT 31.4* 29.7*   MCV 99.7* 98.7   MCH 32.1* 31.6*   MCHC 32.2* 32.0*   RDW 14.1 14.1   PLATELETCT 292 243   MPV 10.5* 10.5*     Recent Labs     10/13/20  0712 10/14/20  0930 10/15/20  0716   SODIUM 130* 128* 132*   POTASSIUM 4.2 4.1 3.7   CHLORIDE 96* 94* 94*   CO2 33* 30 30   GLUCOSE 113* 142* 135*   BUN 32* 36* 37*   CREATININE 0.7 0.8 0.9   CALCIUM 9.6 9.7 10.0     Recent Labs     10/14/20  0930 10/15/20  0716   ASTSGOT 16 16   ALTSGPT 13 14   TBILIRUBIN 0.4 0.4   ALKPHOSPHAT 108 113            Imaging:   DX-CHEST-FOR LINE PLACEMENT Perform procedure in: Patient's Room   Final Result      Left subclavian central line identified with tip in expected location of the superior vena cava.      Left pleural fluid and left basilar consolidation.      OUTSIDE IMAGES-CT ABDOMEN /PELVIS   Final Result      OUTSIDE IMAGES-CT ABDOMEN /PELVIS   Final Result      OUTSIDE IMAGES-DX ABDOMEN   Final Result      [id number] FILM X-RAY   Final Result      OUTSIDE IMAGES-US VASCULAR   Final Result      OUTSIDE IMAGES-DX ABDOMEN   Final Result      DX-UPPER GI-SMALL BOWEL FOLLOW THRU    (Results Pending)       Assessment and Plan:   Linda is an 85-year-old female with multiple comorbidities who has had a relatively protracted hospital course relating to GI obstructive symptoms.  Outpatient work-up included negative pulmonary embolus CT, CT scan of the abdomen which suggested a distal small bowel obstruction, upper endoscopy for GI bleeding which did reveal a difficult to traverse hiatal hernia and some mild gastritis.  According to notes for that  procedure, the pylorus was traversed and the duodenum was examined.  Upper GI showed pooling in the hiatal hernia sac with some delayed emptying into the gastric fundus but contrast was not followed distally.The thought at this point is that patient might need a hiatal hernia repair and considering her age and comorbidities this would become a relatively high risk procedure.  She was therefore transferred to this hospital for further work-up and management.    From a surgical point of view, this is not a straightforward problem.  Patient has been tolerating oral intake in the form of clear liquids and he she is passing flatus and having bowel movements.  She has a 2-week-old central line from an outlying facility with TPN.  I advised the nurse to waste the TPN manufactured at outside hospital.  Could potentially remove the central line as well.  Continue PPI.  I have discussed the patient's case and reviewed imaging with the surgical team as well as the radiologist on duty. It seems prudent to get more information before making a formal surgical plan.  Patient can have clear liquids tonight but will be n.p.o. at midnight for upper GI series with small bowel follow-through which hopefully will help us elucidate emptying of her hiatal hernia, emptying of the stomach and passage through the small bowel to see if there is any other process.  It may be helpful to discuss the case with gastroenterology as well to see if they have any recommendations regarding possible foregut dysmotility and the need for follow-up endoscopy study.    We will follow closely.     ____________________________________     Vadim Higgins D.O.    DD: 10/15/2020  6:42 PM

## 2020-10-16 NOTE — THERAPY
Missed Therapy     Patient Name: Linda Haley  Age:  85 y.o., Sex:  female  Medical Record #: [id number]  Today's Date: 10/16/2020     10/16/20 0701   Interdisciplinary Plan of Care Collaboration   IDT Collaboration with  Nursing   Collaboration Comments Order received for swallow evaluation.  Pt is being followed by surgery and is currently NPO and scheduled for GI series and possible surgery today.  RN to page SLP if swallow eval is needed today.

## 2020-10-16 NOTE — PROGRESS NOTES
Pt is A&O 4  Pain denies   Denies nausea  Tolerating a clear liquid diet, NPO at midline for planned GI procedure later this day  + Voids per purewick d/t incontinence  + flatus  - BM  Up max assist.  Pt to edge of bed assist x2, pt unable to hold self at edge of bed without assistance  SCD's on  Edema 1+ bilaterally to lower extremities, 2+ bilaterally to upper extremities  Bed alarm on, pt low fall risk per breanna paul  Reviewed plan of care with patient, bed in lowest position and locked, pt resting comfortably now, call light within reach, all needs met at this time. Interventions will be executed per plan of care

## 2020-10-16 NOTE — THERAPY
Occupational Therapy Contact Note    Pending further GI workup, possible surgery. Will hold until POC established.    Samantha Deluna, OTR/L

## 2020-10-16 NOTE — CARE PLAN
Problem: Communication  Goal: The ability to communicate needs accurately and effectively will improve  Outcome: PROGRESSING AS EXPECTED     Problem: Venous Thromboembolism (VTW)/Deep Vein Thrombosis (DVT) Prevention:  Goal: Patient will participate in Venous Thrombosis (VTE)/Deep Vein Thrombosis (DVT)Prevention Measures  Outcome: PROGRESSING AS EXPECTED     Problem: Skin Integrity  Goal: Risk for impaired skin integrity will decrease  Outcome: PROGRESSING AS EXPECTED     Problem: Mobility  Goal: Risk for activity intolerance will decrease  Outcome: PROGRESSING SLOWER THAN EXPECTED

## 2020-10-16 NOTE — CARE PLAN
Problem: Communication  Goal: The ability to communicate needs accurately and effectively will improve  Outcome: PROGRESSING AS EXPECTED  Note: POC discussed with pt. Questions answered at bedside. Upper GI series this am      Problem: Safety  Goal: Will remain free from falls  Outcome: PROGRESSING AS EXPECTED

## 2020-10-17 LAB
ALBUMIN SERPL BCP-MCNC: 2.4 G/DL (ref 3.2–4.9)
ALBUMIN/GLOB SERPL: 0.8 G/DL
ALP SERPL-CCNC: 146 U/L (ref 30–99)
ALT SERPL-CCNC: 29 U/L (ref 2–50)
ANION GAP SERPL CALC-SCNC: 4 MMOL/L (ref 7–16)
AST SERPL-CCNC: 26 U/L (ref 12–45)
BASOPHILS # BLD AUTO: 0.9 % (ref 0–1.8)
BASOPHILS # BLD: 0.06 K/UL (ref 0–0.12)
BILIRUB SERPL-MCNC: 0.2 MG/DL (ref 0.1–1.5)
BUN SERPL-MCNC: 30 MG/DL (ref 8–22)
CALCIUM SERPL-MCNC: 9.2 MG/DL (ref 8.5–10.5)
CHLORIDE SERPL-SCNC: 90 MMOL/L (ref 96–112)
CO2 SERPL-SCNC: 37 MMOL/L (ref 20–33)
CREAT SERPL-MCNC: 0.57 MG/DL (ref 0.5–1.4)
EOSINOPHIL # BLD AUTO: 0.09 K/UL (ref 0–0.51)
EOSINOPHIL NFR BLD: 1.4 % (ref 0–6.9)
ERYTHROCYTE [DISTWIDTH] IN BLOOD BY AUTOMATED COUNT: 51.5 FL (ref 35.9–50)
FERRITIN SERPL-MCNC: 1476 NG/ML (ref 10–291)
GLOBULIN SER CALC-MCNC: 2.9 G/DL (ref 1.9–3.5)
GLUCOSE BLD-MCNC: 100 MG/DL (ref 65–99)
GLUCOSE BLD-MCNC: 124 MG/DL (ref 65–99)
GLUCOSE BLD-MCNC: 130 MG/DL (ref 65–99)
GLUCOSE BLD-MCNC: 140 MG/DL (ref 65–99)
GLUCOSE SERPL-MCNC: 117 MG/DL (ref 65–99)
HCT VFR BLD AUTO: 33.1 % (ref 37–47)
HGB BLD-MCNC: 10.6 G/DL (ref 12–16)
HGB RETIC QN AUTO: 23.5 PG/CELL (ref 29–35)
IMM GRANULOCYTES # BLD AUTO: 0.05 K/UL (ref 0–0.11)
IMM GRANULOCYTES NFR BLD AUTO: 0.8 % (ref 0–0.9)
IMM RETICS NFR: 28.3 % (ref 9.3–17.4)
IRON SATN MFR SERPL: ABNORMAL % (ref 15–55)
IRON SERPL-MCNC: 18 UG/DL (ref 40–170)
LYMPHOCYTES # BLD AUTO: 0.83 K/UL (ref 1–4.8)
LYMPHOCYTES NFR BLD: 12.6 % (ref 22–41)
MAGNESIUM SERPL-MCNC: 2.3 MG/DL (ref 1.5–2.5)
MCH RBC QN AUTO: 32 PG (ref 27–33)
MCHC RBC AUTO-ENTMCNC: 32 G/DL (ref 33.6–35)
MCV RBC AUTO: 100 FL (ref 81.4–97.8)
MONOCYTES # BLD AUTO: 0.76 K/UL (ref 0–0.85)
MONOCYTES NFR BLD AUTO: 11.6 % (ref 0–13.4)
NEUTROPHILS # BLD AUTO: 4.79 K/UL (ref 2–7.15)
NEUTROPHILS NFR BLD: 72.7 % (ref 44–72)
NRBC # BLD AUTO: 0 K/UL
NRBC BLD-RTO: 0 /100 WBC
NT-PROBNP SERPL IA-MCNC: 126 PG/ML (ref 0–125)
PHOSPHATE SERPL-MCNC: 3.2 MG/DL (ref 2.5–4.5)
PLATELET # BLD AUTO: 225 K/UL (ref 164–446)
PMV BLD AUTO: 10.8 FL (ref 9–12.9)
POTASSIUM SERPL-SCNC: 3.7 MMOL/L (ref 3.6–5.5)
PROCALCITONIN SERPL-MCNC: 0.25 NG/ML
PROT SERPL-MCNC: 5.3 G/DL (ref 6–8.2)
RBC # BLD AUTO: 3.31 M/UL (ref 4.2–5.4)
RETICS # AUTO: 0.11 M/UL (ref 0.04–0.06)
RETICS/RBC NFR: 3.5 % (ref 0.8–2.1)
SODIUM SERPL-SCNC: 131 MMOL/L (ref 135–145)
TIBC SERPL-MCNC: 103 UG/DL (ref 250–450)
UIBC SERPL-MCNC: 85 UG/DL (ref 110–370)
WBC # BLD AUTO: 6.6 K/UL (ref 4.8–10.8)

## 2020-10-17 PROCEDURE — 92610 EVALUATE SWALLOWING FUNCTION: CPT

## 2020-10-17 PROCEDURE — 85046 RETICYTE/HGB CONCENTRATE: CPT

## 2020-10-17 PROCEDURE — 770001 HCHG ROOM/CARE - MED/SURG/GYN PRIV*

## 2020-10-17 PROCEDURE — 700101 HCHG RX REV CODE 250: Performed by: STUDENT IN AN ORGANIZED HEALTH CARE EDUCATION/TRAINING PROGRAM

## 2020-10-17 PROCEDURE — 83735 ASSAY OF MAGNESIUM: CPT

## 2020-10-17 PROCEDURE — 83540 ASSAY OF IRON: CPT

## 2020-10-17 PROCEDURE — 94669 MECHANICAL CHEST WALL OSCILL: CPT

## 2020-10-17 PROCEDURE — 700101 HCHG RX REV CODE 250: Performed by: HOSPITALIST

## 2020-10-17 PROCEDURE — 99233 SBSQ HOSP IP/OBS HIGH 50: CPT | Performed by: STUDENT IN AN ORGANIZED HEALTH CARE EDUCATION/TRAINING PROGRAM

## 2020-10-17 PROCEDURE — 700111 HCHG RX REV CODE 636 W/ 250 OVERRIDE (IP): Performed by: STUDENT IN AN ORGANIZED HEALTH CARE EDUCATION/TRAINING PROGRAM

## 2020-10-17 PROCEDURE — A9270 NON-COVERED ITEM OR SERVICE: HCPCS | Performed by: HOSPITALIST

## 2020-10-17 PROCEDURE — 85025 COMPLETE CBC W/AUTO DIFF WBC: CPT

## 2020-10-17 PROCEDURE — 84145 PROCALCITONIN (PCT): CPT

## 2020-10-17 PROCEDURE — 83550 IRON BINDING TEST: CPT

## 2020-10-17 PROCEDURE — 82962 GLUCOSE BLOOD TEST: CPT | Mod: 91

## 2020-10-17 PROCEDURE — C9113 INJ PANTOPRAZOLE SODIUM, VIA: HCPCS | Performed by: HOSPITALIST

## 2020-10-17 PROCEDURE — 700102 HCHG RX REV CODE 250 W/ 637 OVERRIDE(OP): Performed by: HOSPITALIST

## 2020-10-17 PROCEDURE — 82728 ASSAY OF FERRITIN: CPT

## 2020-10-17 PROCEDURE — 84100 ASSAY OF PHOSPHORUS: CPT

## 2020-10-17 PROCEDURE — 83880 ASSAY OF NATRIURETIC PEPTIDE: CPT

## 2020-10-17 PROCEDURE — 700105 HCHG RX REV CODE 258: Performed by: STUDENT IN AN ORGANIZED HEALTH CARE EDUCATION/TRAINING PROGRAM

## 2020-10-17 PROCEDURE — 36415 COLL VENOUS BLD VENIPUNCTURE: CPT

## 2020-10-17 PROCEDURE — 700111 HCHG RX REV CODE 636 W/ 250 OVERRIDE (IP): Performed by: HOSPITALIST

## 2020-10-17 PROCEDURE — 80053 COMPREHEN METABOLIC PANEL: CPT

## 2020-10-17 RX ORDER — SODIUM CHLORIDE 9 MG/ML
INJECTION, SOLUTION INTRAVENOUS
Status: ACTIVE
Start: 2020-10-17 | End: 2020-10-18

## 2020-10-17 RX ADMIN — PANTOPRAZOLE SODIUM 40 MG: 40 INJECTION, POWDER, LYOPHILIZED, FOR SOLUTION INTRAVENOUS at 05:18

## 2020-10-17 RX ADMIN — NYSTATIN: 100000 POWDER TOPICAL at 17:53

## 2020-10-17 RX ADMIN — DOCUSATE SODIUM 50 MG AND SENNOSIDES 8.6 MG 2 TABLET: 8.6; 5 TABLET, FILM COATED ORAL at 05:18

## 2020-10-17 RX ADMIN — LEVOTHYROXINE SODIUM 100 MCG: 0.05 TABLET ORAL at 05:18

## 2020-10-17 RX ADMIN — PANTOPRAZOLE SODIUM 40 MG: 40 INJECTION, POWDER, LYOPHILIZED, FOR SOLUTION INTRAVENOUS at 17:53

## 2020-10-17 RX ADMIN — SODIUM CHLORIDE, PRESERVATIVE FREE 10 ML: 5 INJECTION INTRAVENOUS at 05:28

## 2020-10-17 RX ADMIN — SODIUM CHLORIDE, PRESERVATIVE FREE 10 ML: 5 INJECTION INTRAVENOUS at 17:56

## 2020-10-17 RX ADMIN — CALCIUM GLUCONATE: 98 INJECTION, SOLUTION INTRAVENOUS at 20:20

## 2020-10-17 RX ADMIN — NYSTATIN: 100000 POWDER TOPICAL at 05:18

## 2020-10-17 RX ADMIN — DOCUSATE SODIUM 50 MG AND SENNOSIDES 8.6 MG 2 TABLET: 8.6; 5 TABLET, FILM COATED ORAL at 18:00

## 2020-10-17 RX ADMIN — GABAPENTIN 200 MG: 100 CAPSULE ORAL at 17:52

## 2020-10-17 ASSESSMENT — ENCOUNTER SYMPTOMS
NECK PAIN: 0
NAUSEA: 0
DEPRESSION: 0
CHILLS: 0
HEADACHES: 0
MYALGIAS: 0
HEARTBURN: 1
ABDOMINAL PAIN: 0
CONSTIPATION: 1
BLURRED VISION: 0
COUGH: 1
DIZZINESS: 0
BLOOD IN STOOL: 0
SHORTNESS OF BREATH: 1
DOUBLE VISION: 0
FEVER: 0
VOMITING: 0
DIARRHEA: 0
PALPITATIONS: 0

## 2020-10-17 ASSESSMENT — PAIN DESCRIPTION - PAIN TYPE
TYPE: CHRONIC PAIN
TYPE: CHRONIC PAIN

## 2020-10-17 NOTE — PROGRESS NOTES
2 RN skin check complete with Yasmeen RN    Skin generalized fragile, bruised and dry  Subclavian central line pulled this PM per MD order, petrolium gauze, dry gauze and tegederm in place over site.  2+ swelling and slight redness noted to L hand/wrist.  Pt states it is sore and stiff.  No noticeable break in skin integrity  Bruising present to L hip  Area of redness and abrasion noted to R inner, upper thigh, MADISON  Blanchable redness to sacrum, no mepilex applied d/t incontinence  Purewick in place d/t incontinence  Feet dry, no redness noted to heels, 1+ edema noted to bilateral lower extremities  Pt on waffle overlay, Q 2 turns in place

## 2020-10-17 NOTE — PROGRESS NOTES
Pharmacy TPN Day # 2       10/17/2020    Dosing Weight   58.8 kg              TPN currently providing 50% of goal  TPN goal: 3326-4711 kcal/day including 1.2-1.5 gm/kg/day Protein  TPN indication: SBO                                                                Pertinent PMH: 86 y/o F transferred from [facility] for complex issues relating to possible bowel obstruction and possible gastric outlet obstruction secondary to hiatal hernia.  Patient was admitted near the end of September with what was thought to be a mechanical bowel obstruction. Work-up included upper endoscopy which discovered gastritis and somewhat difficult to traverse hiatal hernia. Upper GI showed slow emptying of the hiatal hernia sac and subsequent pooling of contrast within the stomach but follow-through was not completed.      Temp (24hrs), Av.7 °C (98.1 °F), Min:36.4 °C (97.5 °F), Max:37.2 °C (98.9 °F)  .  Recent Labs     10/14/20  0930 10/15/20  0716 10/15/20  1945 10/16/20  0645 10/17/20  0535   SODIUM 128* 132*  --  130* 131*   POTASSIUM 4.1 3.7  --  3.8 3.7   CHLORIDE 94* 94*  --  90* 90*   CO2 30 30  --  33 37*   BUN 36* 37*  --  25* 30*   CREATININE 0.8 0.9  --  0.50 0.57   GLUCOSE 142* 135*  --  108* 117*   CALCIUM 9.7 10.0  --  9.6 9.2   ASTSGOT 16 16  --  19 26   ALTSGPT 13 14  --  19 29   ALBUMIN 1.9* 1.8*  --  2.3* 2.4*   TBILIRUBIN 0.4 0.4  --  0.5 0.2   PHOSPHORUS 2.8 3.0  --   --  3.2   MAGNESIUM 2.0 1.9 1.8  --  2.3   PREALBUMIN 13.2*  --   --   --   --      Accu-Checks  Recent Labs     10/16/20  1803 10/16/20  1957 10/17/20  0543   POCGLUCOSE 96 83 124*       Vitals:    10/16/20 2007 10/16/20 2345 10/17/20 0343 10/17/20 0745   BP: 122/56 128/62 116/68 124/65   Weight:       Height:           Intake/Output Summary (Last 24 hours) at 10/17/2020 0901  Last data filed at 10/17/2020 0343  Gross per 24 hour   Intake 0 ml   Output 2200 ml   Net -2200 ml       Orders Placed This Encounter   Procedures   • Diet  NPO     Standing Status:   Standing     Number of Occurrences:   8     Order Specific Question:   Restrict to:     Answer:   Sips with Medications [3]         TPN for past 72 hours (Show up to 3 orders; newest on the left.)     Start date and time   10/16/2020 2000      TPN Central Line Formulation [[id number]]    Order Status  Active    Last Admin  New Bag at 10/16/2020 2018 by Court Medina R.N.       Base    Clinisol 15%  45 g    dextrose  83 g    fat emulsions 20%  19 g       Additives    potassium phosphate  15 mmol    potassium chloride  53 mEq    sodium acetate  96 mEq    sodium chloride  180 mEq    magnesium sulfate  16 mEq    calcium GLUConate  4.65 mEq    M.T.E. -5 Adult  1 mL    M.V.I. Adult  10 mL       QS Base    sterile water for inj(pf)  1,136.96 mL       Energy Contribution    Proteins  --    Dextrose  --    Lipids  --    Total  --       Electrolyte Ion Calculated Amount    Sodium  276 mEq    Potassium  75 mEq    Calcium  4.65 mEq    Magnesium  16 mEq    Aluminum  --    Phosphate  15 mmol    Chloride  233 mEq    Acetate  134.1 mEq       Other    Total Protein  45 g    Total Protein/kg  0.63 g/kg    Total Amino Acid  --    Total Amino Acid/kg  --    Glucose Infusion Rate  0.8 mg/kg/min    Osmolarity (Estimated)  --    Volume  1,800 mL    Rate  75 mL/hr    Dosing Weight  71.9 kg    Infusion Site  Central            This formula provides:  % kcal as lipids = 29  Grams protein/kg = 0.77  Non-protein calories = 472  Kcals/kg = 11  Total daily calories = 652     Comments:  · TPN @50% goal.  · Diet:NPO  · Per RN note: Pt currently NPO with sips for meds, pt requesting to have diet advanced.  TPN running via PICC line. + Voids per car d/t incontinence,+ flatus,- BM.     Fluids:  · TPN: 31mL/kg day                 Macronutrients:  · CHO 43% AA 28%Lipids 29%  · GIR 1.05 mg/kg min  · Accuchecks: 108-124     Micronutrients:  · Na 276meq  · K 85meq  · Ca 4.65meq  · Mg 16meq  · Phos 15meq        Davie Chi  PharmD BCPS

## 2020-10-17 NOTE — THERAPY
"Speech Language Pathology   Clinical Swallow Evaluation     Patient Name: Linda Haley  AGE:  85 y.o., SEX:  female  Medical Record #: [id number]  Today's Date: 10/17/2020     Precautions  Precautions: (P) Fall Risk, Swallow Precautions ( See Comments)  Comments: (P) full liquid diet with reflux precautions    Assessment    84 YO female transfered 10/15/20 from [facility] 2/2 potential gastric outlet obstruction/hiatal hernia.PMHx: hypothyroidism,DM,GI bleed, gastritis,resp failure,asp pneumonia,hiatal hernia. CMHx:gastric outflow obstruction, anemia, GI bleed, reucrrent left pleural effusion, hypomagnesemia, aspiration pnuemonia, hiatal hernia. Pt receiving TPN. Barium swallow 10/12/20 showed \"5 cm hiatal hernia\" and \"evidence of gastroesophageal reflux\". CXR 10/15/20 \"Left pleural fluid and left basilar consolidation\".    Pt seen this date for clinical swallow evaluation 2/2 concerns for oropharyngeal dysphagia. Pt currently NPO/TPN, but cleared by GI for full liquid diet pending clinical swallow evaluation. Oral mech exam completed, no gross oral motor deficits appreciated. Pt is missing all upper molars. Pt reports having an upper partial, however, upper partial not present. Upon sitting up at 90* pt with multiple long belches. PO trial of ice, MTL, thins via cup and straw and applesauce assessed. Pt cleared for full liquid diet per GI. Hyolaryngeal elevation palpated as complete, timely initiation of swallow appreciated and vocal quality remained clear throughout evaluation. No s/sx of aspiration appreciated with any consistencies consumed. Pt required 1:1 feeding assistance across trials due to upper extremity weakness. Evidence of GERD appreciated during Barium Swallow Study, therefore, recommend reflux precautions.     Recommend initiation of a full liquid diet with adherence to the following: upright at 90* for PO, upright at 45* for 30 min after meals, multiple swallows, straws okay. SLP following.  " "    Plan    Recommend Speech Therapy 3 times per week until therapy goals are met for the following treatments:  Dysphagia Training and Patient / Family / Caregiver Education.    Discharge Recommendations: (P) Recommend outpatient speech therapy services    Subjective    Pt was awake, alert, followed directions and eager for PO.     Objective       10/17/20 1343   Oral Motor Eval    Is Patient Able to Complete Oral Motor Eval Yes, Within Normal Limits   Laryngeal Function   Voice Quality Within Functional Limits   Volutional Cough Within Functional Limits   Excursion Upon Swallow Complete   Oral Food Presentation   Ice Chips Within Functional Limits   Single Swallow Mildly Thick (2) - (Nectar Thick)  Within Functional Limits   Serial Swallow Mildly Thick (2) - (Nectar Thick) Within Functional Limits   Single Swallow Thin (0) Within Functional Limits   Serial Swallow Thin (0) Within Functional Limits   Liquidised (3) Within Functional Limits   Pureed (4) Not Tested   Minced & Moist (5) - (Dysphagia II) Not Tested   Soft & Bite-Sized (6) - (Dysphagia III) Not Tested   Regular (7) Not Tested   Regular-Easy to Chew (7) Not Tested   Self Feeding Needs Assistance   Tracheostomy   Tracheostomy  No   Dysphagia Strategies / Recommendations   Strategies / Interventions Recommended (Yes / No) Yes   Compensatory Strategies Strict 1:1 Feeding;Head of Bed 90 Degrees During Eating / Drinking;Head of Bed 45 Degrees After Meals;Multiple Swallows   Diet / Liquid Recommendation Thin (0);Other (Comments)  (full liquid, thins)   Medication Administration  Crush all Medications in Puree  (crush in applesauce)   Therapy Interventions Dysphagia Therapy By Speech Language Pathologist   Dysphagia Rating   Nutritional Liquid Intake Rating Scale Non thickened beverages   Nutritional Food Intake Rating Scale Total oral diet of a single consistency   Patient / Family Goals   Patient / Family Goal #1 \"orange juice\"   Short Term Goals   Short Term " Goal # 1 Pt will consume a full liquid/thins diet with no s/sx of aspiration and min cues for swallow strategies/reflux precautions.

## 2020-10-17 NOTE — PROGRESS NOTES
"Patient yelling out into the hallway for \"orange juice\"  Patient is a 1:1 feeder, patient educated that the RN and CNA will be rounding Qhour to assist her drinking, but that it is unrealistic for the staff to answer her call light every ten minutes when she wants a sip of juice.      Patient verbalized understanding  "

## 2020-10-17 NOTE — PROGRESS NOTES
2 RN skin check complete with Inderjit RN     Skin generalized fragile, bruised and dry  Subclavian central line present to L upper chest, dressing CDI  2+ swelling to bilateral upper extremities  Bruising present to L hip  Area of redness and abrasion noted to R inner, upper thigh, MADISON  Blanchable redness to sacrum, mepilex place  Purewick in place d/t incontinence  Feet dry, no redness noted to heels, 1+ edema noted to bilateral lower extremities  Pt on waffle overlay, Q 2 turns in place

## 2020-10-17 NOTE — PROGRESS NOTES
Hospital Medicine Daily Progress Note    Date of Service  10/17/2020    Chief Complaint  85 y.o. female admitted 10/15/2020 with vomiting and possible gastric outlet obstruction    Hospital Course    50-year-old female with a past medical history of hypothyroidism, diabetes mellitus, GI bleed, gastritis, normocytic anemia, respiratory failure, aspiration pneumonia, and hiatal hernia was transferred from [facility] for potential gastric outlet obstruction secondary to hiatal hernia requiring total parenteral nutrition.       Interval Problem Update  Patient evaluated at bedside and found AAOX4, afebrile and in no acute distress  Dyspnea better this AM  Stable vitals, patient saturating well on 2 L nasal cannula  CBC with normocytic anemia, RI<2, Ferritin high, likely ACi  PICC placed yesterday   Upper GI small bowel without obstruction but she was noted for moderate thoracic esophageal dysmotility  Gastroenterology following, appreciate recommendations  General surgery following, appreciate recommendations  Pending SLP/PT/OT eval    Consultants/Specialty  General surgery  Gastroenterology    Code Status  DNAR/DNI    Disposition  Inpatient    Review of Systems  Review of Systems   Constitutional: Positive for malaise/fatigue. Negative for chills and fever.   HENT: Negative for hearing loss and tinnitus.    Eyes: Negative for blurred vision and double vision.   Respiratory: Positive for cough and shortness of breath.    Cardiovascular: Negative for chest pain and palpitations.   Gastrointestinal: Positive for constipation. Negative for vomiting.   Genitourinary: Negative for dysuria and urgency.   Musculoskeletal: Negative for myalgias and neck pain.   Skin: Negative for itching and rash.   Neurological: Negative for dizziness and headaches.   Psychiatric/Behavioral: Negative for depression and suicidal ideas.        Physical Exam  Temp:  [36.4 °C (97.5 °F)-37.2 °C (98.9 °F)] 36.4 °C (97.5 °F)  Pulse:  [85-98]  90  Resp:  [16-20] 18  BP: (116-146)/(56-80) 124/65  SpO2:  [91 %-98 %] 95 %    Physical Exam  Constitutional:       General: She is not in acute distress.     Appearance: Normal appearance.   HENT:      Head: Normocephalic and atraumatic.   Eyes:      Extraocular Movements: Extraocular movements intact.      Pupils: Pupils are equal, round, and reactive to light.   Neck:      Musculoskeletal: Normal range of motion and neck supple.   Cardiovascular:      Rate and Rhythm: Normal rate and regular rhythm.      Pulses: Normal pulses.      Heart sounds: Normal heart sounds.   Pulmonary:      Effort: Pulmonary effort is normal.      Breath sounds: Rales present.   Abdominal:      General: Bowel sounds are normal. There is distension.      Palpations: Abdomen is soft.   Musculoskeletal: Normal range of motion.         General: No tenderness.      Comments: Mild B/L LE edema   Skin:     General: Skin is dry.      Coloration: Skin is not jaundiced.   Neurological:      General: No focal deficit present.      Mental Status: She is alert and oriented to person, place, and time.      Cranial Nerves: No cranial nerve deficit.   Psychiatric:         Mood and Affect: Mood normal.         Behavior: Behavior normal.         Thought Content: Thought content normal.         Judgment: Judgment normal.         Fluids    Intake/Output Summary (Last 24 hours) at 10/17/2020 0847  Last data filed at 10/17/2020 0343  Gross per 24 hour   Intake 0 ml   Output 2200 ml   Net -2200 ml       Laboratory  Recent Labs     10/15/20  0716 10/16/20  0645 10/17/20  0535   WBC 8.1 7.3 6.6   RBC 3.01* 2.84* 3.31*   HEMOGLOBIN 9.5* 8.9* 10.6*   HEMATOCRIT 29.7* 28.1* 33.1*   MCV 98.7 98.9* 100.0*   MCH 31.6* 31.3 32.0   MCHC 32.0* 31.7* 32.0*   RDW 14.1 51.1* 51.5*   PLATELETCT 243 263 225   MPV 10.5* 10.8 10.8     Recent Labs     10/15/20  0716 10/16/20  0645 10/17/20  0535   SODIUM 132* 130* 131*   POTASSIUM 3.7 3.8 3.7   CHLORIDE 94* 90* 90*   CO2 30 33  37*   GLUCOSE 135* 108* 117*   BUN 37* 25* 30*   CREATININE 0.9 0.50 0.57   CALCIUM 10.0 9.6 9.2             Recent Labs     10/14/20  0930   TRIGLYCERIDE 91       Imaging  IR-PICC LINE PLACEMENT W/ GUIDANCE > AGE 5   Final Result                  Ultrasound-guided PICC placement performed by qualified nursing staff as    above.          DX-UPPER GI-SMALL BOWEL FOLLOW THRU   Final Result      1.  Negative small bowel follow-through. No obstruction      2.  Moderate thoracic esophageal dysmotility and the esophagus is mildly dilated.      DX-CHEST-FOR LINE PLACEMENT Perform procedure in: Patient's Room   Final Result      Left subclavian central line identified with tip in expected location of the superior vena cava.      Left pleural fluid and left basilar consolidation.      OUTSIDE IMAGES-CT ABDOMEN /PELVIS   Final Result      OUTSIDE IMAGES-CT ABDOMEN /PELVIS   Final Result      OUTSIDE IMAGES-DX ABDOMEN   Final Result      [id number] FILM X-RAY   Final Result      OUTSIDE IMAGES-US VASCULAR   Final Result      OUTSIDE IMAGES-DX ABDOMEN   Final Result           Assessment/Plan  * Gastric outflow obstruction- (present on admission)  Assessment & Plan  Transfer to [facility] for surgery/GI evaluation for possible bowel obstruction and possible gastric outlet obstruction secondary to hiatal hernia   Keep n.p.o. for now, continue TPN  Speech eval  10/16 Upper GI small bowel without obstruction but she was noted for moderate thoracic esophageal dysmotility  Gastroenterology following, appreciate recommendations  General surgery following, appreciate recommendations    Anemia- (present on admission)  Assessment & Plan  Chronic normocytic anemia with high RDW  Stable VS  Hemoglobin trending down slowly  Repeat iron studies  Transfuse if hemoglobin lower than 7  Labs on AM    GI bleed- (present on admission)  Assessment & Plan  Hb trending down slowly  History of GI bleed at outside facility  Continue IV PPI  for now  Repeat iron studies and H&H  Transfuse if hemoglobin lower than 7  GI following, appreciate recommendations  Labs in AM    Recurrent left pleural effusion  Assessment & Plan  Noted on outside Chest CT 10 days ago and on 10/15 CXR  Completed IV antibiotic for aspiration pneumonia at outside facility  10/2020 ECHO w/EF 70%  Patient saturating well on 2 L nasal cannula  Likely secondary to atelectasis impression from large hiatal hernia  Procal and BNP    Hypomagnesemia  Assessment & Plan  Replete as necessary    Aspiration pneumonia (HCC)- (present on admission)  Assessment & Plan  Completed antibiotic regimen at outside facility, will stop to be due to possible gastric obstruction  No signs of active infection  Keep n.p.o. for now  Continue to TPN for now  SPL eval    Hiatal hernia- (present on admission)  Assessment & Plan  She has been evaluated by surgery at outside facility recommending transfer to [facility] for surgery/GI evaluation  General surgery following, appreciate recommendations       VTE prophylaxis: SCDs

## 2020-10-17 NOTE — CARE PLAN
Problem: Communication  Goal: The ability to communicate needs accurately and effectively will improve  Outcome: PROGRESSING AS EXPECTED   Participation in POC  Problem: Safety  Goal: Will remain free from injury  Outcome: PROGRESSING AS EXPECTED   Fall precautions in place  Problem: Skin Integrity  Goal: Risk for impaired skin integrity will decrease  Outcome: PROGRESSING AS EXPECTED   Q2 turns, waffle mattress,

## 2020-10-17 NOTE — PROGRESS NOTES
Gastroenterology Progress Note     Author: Alejandro Chapin M.D.   Date & Time Created: 10/17/2020 11:27 AM    Chief Complaint:  Vomiting    History of Presenting Illness  85 y.o. female with HTN, hyperlipidemia, DM who was transferred from [facility] 10/15/2020 for vomiting and concern for gastric outlet obstruction and small bowel obstruction.     Most of history obtained from review of chart and she is poor historian overall.     She apparently initially presented with nausea and vomiting.  Initially felt to have possible small bowel obstruction treated conservatively.  Still had persistent vomiting, unclear if related to oral intake or even occurred without eating.  Additional evaluation with EGD showed retained fluid and food within esophagus and hiatal hernia (almost 600 mL) per report as well as possible slight narrowing of pylorus.  Gastric biopsies negative for H pylori.  Difficult to maneuver scope through hiatal hernia.  UGI series showed slow emptying of hiatal hernia sac but no SBFT performed.  She has been on TPN  But is also taking clear liquids per report.  She denies issues currently.  She is having BMs per her.  Denies abdominal pain.     She has been seen by surgery and is currently undergoing UGI SBFT.     She did have EGD 2015 showing 5 cm HH with [place] erosions and MW tear.    Interval History:  10/17/2020: UGI SBFT yesterday shows esophageal dysmotility but no obvious hiatal hernia or gastric outlet obstruction and normal small bowel transit.  She wants to eat and drink.  She is difficult historian.  When asked if she has had ongoing dysphagia, vomiting, heartburn, regurgitation at home prior to hospital stay, she denies but unclear.  I reviewed imaging with Dr. Chambers radiology today.  He feels that UGI SBFT does show large hiatal hernia with some delayed exit of contrast likely contributing to possible reflux or delayed clearance from esophagus but limited images.  Prior CT with large hiatal  hernia as well.     Review of Systems:  Review of Systems   Constitutional: Positive for malaise/fatigue. Negative for chills and fever.   Respiratory: Positive for cough.    Gastrointestinal: Positive for constipation and heartburn. Negative for abdominal pain, blood in stool, diarrhea, melena, nausea and vomiting.       Physical Exam:  Physical Exam  Vitals signs and nursing note reviewed.   Constitutional:       General: She is not in acute distress.     Appearance: She is not ill-appearing or toxic-appearing.      Comments: Elderly appearing   Cardiovascular:      Rate and Rhythm: Normal rate and regular rhythm.   Pulmonary:      Effort: Pulmonary effort is normal. No respiratory distress.      Breath sounds: No stridor. Rhonchi present. No wheezing or rales.   Abdominal:      General: Abdomen is flat. Bowel sounds are normal. There is no distension.      Palpations: Abdomen is soft. There is no mass.      Tenderness: There is no abdominal tenderness. There is no guarding or rebound.      Hernia: No hernia is present.   Neurological:      General: No focal deficit present.      Mental Status: She is alert and oriented to person, place, and time.         Labs:          Recent Labs     10/15/20  0716 10/15/20  1945 10/16/20  0645 10/17/20  0535   SODIUM 132*  --  130* 131*   POTASSIUM 3.7  --  3.8 3.7   CHLORIDE 94*  --  90* 90*   CO2 30  --  33 37*   BUN 37*  --  25* 30*   CREATININE 0.9  --  0.50 0.57   MAGNESIUM 1.9 1.8  --  2.3   PHOSPHORUS 3.0  --   --  3.2   CALCIUM 10.0  --  9.6 9.2     Recent Labs     10/15/20  0716 10/16/20  0645 10/17/20  0535   ALTSGPT 14 19 29   ASTSGOT 16 19 26   ALKPHOSPHAT 113 127* 146*   TBILIRUBIN 0.4 0.5 0.2   GLUCOSE 135* 108* 117*     Recent Labs     10/15/20  0716 10/16/20  0645 10/17/20  0535   RBC 3.01* 2.84* 3.31*   HEMOGLOBIN 9.5* 8.9* 10.6*   HEMATOCRIT 29.7* 28.1* 33.1*   PLATELETCT 243 263 225   IRON  --   --  18*   FERRITIN  --   --  1476.0*   TOTIRONBC  --   --  103*      Recent Labs     10/15/20  0716 10/16/20  0645 10/17/20  0535   WBC 8.1 7.3 6.6   NEUTSPOLYS 70  --  72.70*   LYMPHOCYTES 15.0*  --  12.60*   MONOCYTES 15.0*  --  11.60   EOSINOPHILS  --   --  1.40   BASOPHILS  --   --  0.90   ASTSGOT 16 19 26   ALTSGPT 14 19 29   ALKPHOSPHAT 113 127* 146*   TBILIRUBIN 0.4 0.5 0.2     Hemodynamics:  Temp (24hrs), Av.7 °C (98.1 °F), Min:36.4 °C (97.5 °F), Max:37.2 °C (98.9 °F)  Temperature: 36.4 °C (97.5 °F)  Pulse  Av.9  Min: 57  Max: 118   Blood Pressure : 124/65     Respiratory:    Respiration: 18, Pulse Oximetry: 94 %     Work Of Breathing / Effort: Mild  RUL Breath Sounds: Clear, RML Breath Sounds: Diminished, RLL Breath Sounds: Diminished, DAVIDSON Breath Sounds: Clear, LLL Breath Sounds: Diminished  Fluids:    Intake/Output Summary (Last 24 hours) at 10/17/2020 1127  Last data filed at 10/17/2020 0343  Gross per 24 hour   Intake 0 ml   Output 2200 ml   Net -2200 ml        GI/Nutrition:  Orders Placed This Encounter   Procedures   • Diet NPO     Standing Status:   Standing     Number of Occurrences:   8     Order Specific Question:   Restrict to:     Answer:   Sips with Medications [3]     Medical Decision Making, by Problem:  Active Hospital Problems    Diagnosis   • *Gastric outflow obstruction [K31.1]   • Anemia [D64.9]   • GI bleed [K92.2]   • Hypomagnesemia [E83.42]   • Recurrent left pleural effusion [J90]   • Hiatal hernia [K44.9]   • Aspiration pneumonia (HCC) [J69.0]     PROBLEMS:  1. Nausea and vomiting  2. Abnormal UGI series.  Initial UGI SBFT here read as no hiatal hernia but after review with radiology today, he does feel there is large hiatal hernia with delayed clearance of contrast from hernia contributing to delayed emptying from esophagus.  However, contrast does flow through hernia and empties out of stomach and traverses small bowel normally    3. Hiatal hernia  4. Possible recent small bowel obstruction  5. Chronic normocytic anemia  6.  Hypertension  7. Hyperlipidemia  8. Diabetes mellitus  9. Protein-calorie malnutrition, moderate     Plan:  1. Recommend speech swallow evaluation.  If no oropharyngeal dysphagia, OK for full liquid diet  2. Await further surgery input for potential repair of hiatal hernia.  Could repeat EGD here if surgery feels appropriate    Quality-Core Measures   Reviewed items::  Medications reviewed, Labs reviewed and Radiology images reviewed

## 2020-10-17 NOTE — CARE PLAN
Problem: Communication  Goal: The ability to communicate needs accurately and effectively will improve  Outcome: PROGRESSING AS EXPECTED     Problem: Safety  Goal: Will remain free from falls  Outcome: PROGRESSING AS EXPECTED     Problem: Bowel/Gastric:  Goal: Normal bowel function is maintained or improved  Outcome: PROGRESSING AS EXPECTED  Goal: Will not experience complications related to bowel motility  Outcome: PROGRESSING SLOWER THAN EXPECTED     Problem: Knowledge Deficit  Goal: Knowledge of disease process/condition, treatment plan, diagnostic tests, and medications will improve  Outcome: PROGRESSING SLOWER THAN EXPECTED  Goal: Knowledge of the prescribed therapeutic regimen will improve  Outcome: PROGRESSING AS EXPECTED

## 2020-10-17 NOTE — PROGRESS NOTES
"Assumed care at 0700    Received report from NOC shift RN.    Reviewed recent lab results, notes, orders, and MAR  POC discussed and updated on care board  Bed is in the lowest and locked position, call light within reach      A&Ox3   Disoriented to time  NPO with sips with medications   \"I want flavored water.\"  TPN  0.5L nasal canula  Incontinent of urine   Pure wick in place  Incontinent of stool   Brief  Denies pain  Denies n/v  Up Max assist      "

## 2020-10-17 NOTE — PROGRESS NOTES
Surgery General Daily Progress Note    Date of Service  10/17/2020    Chief Complaint  85 y.o. female admitted 10/15/2020 with possible obstructive hiatal hernia. Patient on TPN for sometime with limited PO intake. UGI today demonstrates no obstruction of the esophagus/stomach at level of hiatal hernia- though recent EGD did show a large amount of retained fluid/contrast within the proximal intrathoracic stomach. Imaging also demonstrates a dilated esophagus and moderate esophageal dysmotility. As patient has diabetes as well, it is unclear how much of her intolerance to PO intake and nausea is related to delayed gastric emptying.      Physical Exam  Temp:  [36.4 °C (97.5 °F)-37.2 °C (98.9 °F)] 36.4 °C (97.5 °F)  Pulse:  [85-98] 89  Resp:  [16-20] 18  BP: (116-146)/(56-80) 124/65  SpO2:  [91 %-98 %] 94 %    Physical Exam  NAD, appears well  Neck supple, mmm  Regular heart rate  Normal respiratory effort  Abdomen protuberant, nontender  Ext ROM grossly intact  Skin pink warm and dry      Fluids    Intake/Output Summary (Last 24 hours) at 10/17/2020 1343  Last data filed at 10/17/2020 0343  Gross per 24 hour   Intake 0 ml   Output 2200 ml   Net -2200 ml       Laboratory  Recent Labs     10/15/20  0716 10/16/20  0645 10/17/20  0535   WBC 8.1 7.3 6.6   RBC 3.01* 2.84* 3.31*   HEMOGLOBIN 9.5* 8.9* 10.6*   HEMATOCRIT 29.7* 28.1* 33.1*   MCV 98.7 98.9* 100.0*   MCH 31.6* 31.3 32.0   MCHC 32.0* 31.7* 32.0*   RDW 14.1 51.1* 51.5*   PLATELETCT 243 263 225   MPV 10.5* 10.8 10.8     Recent Labs     10/15/20  0716 10/16/20  0645 10/17/20  0535   SODIUM 132* 130* 131*   POTASSIUM 3.7 3.8 3.7   CHLORIDE 94* 90* 90*   CO2 30 33 37*   GLUCOSE 135* 108* 117*   BUN 37* 25* 30*   CREATININE 0.9 0.50 0.57   CALCIUM 10.0 9.6 9.2                       Assessment/Plan  84 y/o female with poor PO intake, hiatal hernia. Unclear contributions to poor PO intake from delayed gastric emptying, hiatal hernia and esophageal dysmotility. Additional  workup needs to be completed before decisions can be made on how best to manage her poor PO intake. She will likely need gastric emptying study and esophageal motility testing to include manometry  1. Appreciate medical care  2. GI consult to help elucidate cause of poor intake, ?repeat EGD, manometry, gastric emptying, etc  3. Will follow closely with you

## 2020-10-17 NOTE — ASSESSMENT & PLAN NOTE
- Noted on outside chest CT and on 10/15 CXR.   - Completed IV antibiotic for aspiration pneumonia at outside facility.   - 10/2020 ECHO w/EF 70%, Pro-BNP < 400.   - Patient saturating well on 1 L nasal cannula.   - Likely secondary to atelectasis from large hiatal hernia.   - Continue IS.

## 2020-10-17 NOTE — PROGRESS NOTES
Pt is A&O 4  L hand has some redness, edema, pt states it feels sore and stiff.   Denies nausea  Pt currently NPO with sips for meds, pt requesting to have diet advanced.  TPN running via PICC line  + Voids per car d/t incontinence  + flatus  - BM  Up max assist  SCD's on  Edema 1+ bilaterally to lower extremities, SCD's in place  Bed alarm on, pt low fall risk per breanna paul  Reviewed plan of care with patient, bed in lowest position and locked, pt resting comfortably now, call light within reach, all needs met at this time. Interventions will be executed per plan of care

## 2020-10-18 LAB
ANION GAP SERPL CALC-SCNC: 4 MMOL/L (ref 7–16)
BASOPHILS # BLD AUTO: 0.9 % (ref 0–1.8)
BASOPHILS # BLD: 0.08 K/UL (ref 0–0.12)
BUN SERPL-MCNC: 34 MG/DL (ref 8–22)
CALCIUM SERPL-MCNC: 9.5 MG/DL (ref 8.5–10.5)
CHLORIDE SERPL-SCNC: 94 MMOL/L (ref 96–112)
CO2 SERPL-SCNC: 36 MMOL/L (ref 20–33)
CREAT SERPL-MCNC: 0.54 MG/DL (ref 0.5–1.4)
EOSINOPHIL # BLD AUTO: 0.18 K/UL (ref 0–0.51)
EOSINOPHIL NFR BLD: 2 % (ref 0–6.9)
ERYTHROCYTE [DISTWIDTH] IN BLOOD BY AUTOMATED COUNT: 52.5 FL (ref 35.9–50)
GLUCOSE BLD-MCNC: 100 MG/DL (ref 65–99)
GLUCOSE BLD-MCNC: 109 MG/DL (ref 65–99)
GLUCOSE BLD-MCNC: 111 MG/DL (ref 65–99)
GLUCOSE BLD-MCNC: 93 MG/DL (ref 65–99)
GLUCOSE SERPL-MCNC: 116 MG/DL (ref 65–99)
HCT VFR BLD AUTO: 23 % (ref 37–47)
HCT VFR BLD AUTO: 24 % (ref 37–47)
HEMOCCULT STL QL: POSITIVE
HGB BLD-MCNC: 7.3 G/DL (ref 12–16)
HGB BLD-MCNC: 7.6 G/DL (ref 12–16)
IMM GRANULOCYTES # BLD AUTO: 0.28 K/UL (ref 0–0.11)
IMM GRANULOCYTES NFR BLD AUTO: 3 % (ref 0–0.9)
LYMPHOCYTES # BLD AUTO: 1.48 K/UL (ref 1–4.8)
LYMPHOCYTES NFR BLD: 16 % (ref 22–41)
MAGNESIUM SERPL-MCNC: 2.5 MG/DL (ref 1.5–2.5)
MCH RBC QN AUTO: 31.6 PG (ref 27–33)
MCHC RBC AUTO-ENTMCNC: 31.6 G/DL (ref 33.6–35)
MCV RBC AUTO: 100 FL (ref 81.4–97.8)
MONOCYTES # BLD AUTO: 1.05 K/UL (ref 0–0.85)
MONOCYTES NFR BLD AUTO: 11.4 % (ref 0–13.4)
NEUTROPHILS # BLD AUTO: 6.16 K/UL (ref 2–7.15)
NEUTROPHILS NFR BLD: 66.7 % (ref 44–72)
NRBC # BLD AUTO: 0 K/UL
NRBC BLD-RTO: 0 /100 WBC
PHOSPHATE SERPL-MCNC: 2.7 MG/DL (ref 2.5–4.5)
PLATELET # BLD AUTO: 252 K/UL (ref 164–446)
PMV BLD AUTO: 10.4 FL (ref 9–12.9)
POTASSIUM SERPL-SCNC: 4.2 MMOL/L (ref 3.6–5.5)
RBC # BLD AUTO: 2.37 M/UL (ref 4.2–5.4)
SODIUM SERPL-SCNC: 134 MMOL/L (ref 135–145)
WBC # BLD AUTO: 9.2 K/UL (ref 4.8–10.8)

## 2020-10-18 PROCEDURE — 92526 ORAL FUNCTION THERAPY: CPT

## 2020-10-18 PROCEDURE — 85025 COMPLETE CBC W/AUTO DIFF WBC: CPT

## 2020-10-18 PROCEDURE — 82962 GLUCOSE BLOOD TEST: CPT

## 2020-10-18 PROCEDURE — 94669 MECHANICAL CHEST WALL OSCILL: CPT

## 2020-10-18 PROCEDURE — C9113 INJ PANTOPRAZOLE SODIUM, VIA: HCPCS | Performed by: HOSPITALIST

## 2020-10-18 PROCEDURE — 85014 HEMATOCRIT: CPT

## 2020-10-18 PROCEDURE — 700111 HCHG RX REV CODE 636 W/ 250 OVERRIDE (IP): Performed by: HOSPITALIST

## 2020-10-18 PROCEDURE — A9270 NON-COVERED ITEM OR SERVICE: HCPCS | Performed by: HOSPITALIST

## 2020-10-18 PROCEDURE — 80048 BASIC METABOLIC PNL TOTAL CA: CPT

## 2020-10-18 PROCEDURE — 700101 HCHG RX REV CODE 250: Performed by: HOSPITALIST

## 2020-10-18 PROCEDURE — 85018 HEMOGLOBIN: CPT

## 2020-10-18 PROCEDURE — 82272 OCCULT BLD FECES 1-3 TESTS: CPT

## 2020-10-18 PROCEDURE — 84100 ASSAY OF PHOSPHORUS: CPT

## 2020-10-18 PROCEDURE — 99233 SBSQ HOSP IP/OBS HIGH 50: CPT | Performed by: STUDENT IN AN ORGANIZED HEALTH CARE EDUCATION/TRAINING PROGRAM

## 2020-10-18 PROCEDURE — 770001 HCHG ROOM/CARE - MED/SURG/GYN PRIV*

## 2020-10-18 PROCEDURE — 700102 HCHG RX REV CODE 250 W/ 637 OVERRIDE(OP): Performed by: HOSPITALIST

## 2020-10-18 PROCEDURE — 83735 ASSAY OF MAGNESIUM: CPT

## 2020-10-18 RX ADMIN — SODIUM CHLORIDE, PRESERVATIVE FREE 10 ML: 5 INJECTION INTRAVENOUS at 16:21

## 2020-10-18 RX ADMIN — PANTOPRAZOLE SODIUM 40 MG: 40 INJECTION, POWDER, LYOPHILIZED, FOR SOLUTION INTRAVENOUS at 16:21

## 2020-10-18 RX ADMIN — GABAPENTIN 200 MG: 100 CAPSULE ORAL at 16:21

## 2020-10-18 RX ADMIN — DOCUSATE SODIUM 50 MG AND SENNOSIDES 8.6 MG 2 TABLET: 8.6; 5 TABLET, FILM COATED ORAL at 04:41

## 2020-10-18 RX ADMIN — NYSTATIN: 100000 POWDER TOPICAL at 16:21

## 2020-10-18 RX ADMIN — PANTOPRAZOLE SODIUM 40 MG: 40 INJECTION, POWDER, LYOPHILIZED, FOR SOLUTION INTRAVENOUS at 04:43

## 2020-10-18 RX ADMIN — LEVOTHYROXINE SODIUM 100 MCG: 0.05 TABLET ORAL at 04:41

## 2020-10-18 RX ADMIN — SODIUM CHLORIDE, PRESERVATIVE FREE 10 ML: 5 INJECTION INTRAVENOUS at 04:42

## 2020-10-18 RX ADMIN — NYSTATIN: 100000 POWDER TOPICAL at 04:41

## 2020-10-18 ASSESSMENT — ENCOUNTER SYMPTOMS
DOUBLE VISION: 0
DIZZINESS: 0
BLOOD IN STOOL: 0
VOMITING: 0
HEADACHES: 0
HEARTBURN: 1
NECK PAIN: 0
PALPITATIONS: 0
DEPRESSION: 0
CONSTIPATION: 1
COUGH: 1
NAUSEA: 0
FEVER: 0
BLURRED VISION: 0
SHORTNESS OF BREATH: 1
MYALGIAS: 0
CHILLS: 0
ABDOMINAL PAIN: 0
DIARRHEA: 0

## 2020-10-18 ASSESSMENT — PAIN DESCRIPTION - PAIN TYPE: TYPE: CHRONIC PAIN

## 2020-10-18 NOTE — PROGRESS NOTES
Gastroenterology Progress Note     Author: Alejandro Chapin M.D.   Date & Time Created: 10/18/2020 11:50 AM    Chief Complaint:  Vomiting    History of Presenting Illness  85 y.o. female with HTN, hyperlipidemia, DM who was transferred from OSH 10/15/2020 for vomiting and concern for gastric outlet obstruction and small bowel obstruction.     Most of history obtained from review of chart and she is poor historian overall.     She apparently initially presented with nausea and vomiting.  Initially felt to have possible small bowel obstruction treated conservatively.  Still had persistent vomiting, unclear if related to oral intake or even occurred without eating.  Additional evaluation with EGD showed retained fluid and food within esophagus and hiatal hernia (almost 600 mL) per report as well as possible slight narrowing of pylorus.  Gastric biopsies negative for H pylori.  Difficult to maneuver scope through hiatal hernia.  UGI series showed slow emptying of hiatal hernia sac but no SBFT performed.  She has been on TPN  But is also taking clear liquids per report.  She denies issues currently.  She is having BMs per her.  Denies abdominal pain.     She has been seen by surgery and is currently undergoing UGI SBFT.     She did have EGD 2015 showing 5 cm HH with [place] erosions and MW tear.    Interval History:  10/17/2020: UGI SBFT yesterday shows esophageal dysmotility but no obvious hiatal hernia or gastric outlet obstruction and normal small bowel transit.  She wants to eat and drink.  She is difficult historian.  When asked if she has had ongoing dysphagia, vomiting, heartburn, regurgitation at home prior to hospital stay, she denies but unclear.  I reviewed imaging with Dr. Chambers radiology today.  He feels that UGI SBFT does show large hiatal hernia with some delayed exit of contrast likely contributing to possible reflux or delayed clearance from esophagus but limited images.  Prior CT with large hiatal  hernia as well.    10/18/2020:  Surgery not planning any surgery currently.  Tolerating full liquid diet.  No BM for 3 days.  Hgb dropped from 10 to 7 but no overt bleeding seen.    Review of Systems:  Review of Systems   Constitutional: Positive for malaise/fatigue. Negative for chills and fever.   Respiratory: Positive for cough.    Gastrointestinal: Positive for constipation and heartburn. Negative for abdominal pain, blood in stool, diarrhea, melena, nausea and vomiting.       Physical Exam:  Physical Exam  Vitals signs and nursing note reviewed.   Constitutional:       General: She is not in acute distress.     Appearance: She is not ill-appearing or toxic-appearing.      Comments: Elderly appearing   Cardiovascular:      Rate and Rhythm: Normal rate and regular rhythm.   Pulmonary:      Effort: Pulmonary effort is normal. No respiratory distress.      Breath sounds: No stridor. Rhonchi present. No wheezing or rales.   Abdominal:      General: Abdomen is flat. Bowel sounds are normal. There is no distension.      Palpations: Abdomen is soft. There is no mass.      Tenderness: There is no abdominal tenderness. There is no guarding or rebound.      Hernia: No hernia is present.   Neurological:      General: No focal deficit present.      Mental Status: She is alert and oriented to person, place, and time.         Labs:          Recent Labs     10/15/20  1945 10/16/20  0645 10/17/20  0535 10/18/20  0500   SODIUM  --  130* 131* 134*   POTASSIUM  --  3.8 3.7 4.2   CHLORIDE  --  90* 90* 94*   CO2  --  33 37* 36*   BUN  --  25* 30* 34*   CREATININE  --  0.50 0.57 0.54   MAGNESIUM 1.8  --  2.3 2.5   PHOSPHORUS  --   --  3.2 2.7   CALCIUM  --  9.6 9.2 9.5     Recent Labs     10/16/20  0645 10/17/20  0535 10/18/20  0500   ALTSGPT 19 29  --    ASTSGOT 19 26  --    ALKPHOSPHAT 127* 146*  --    TBILIRUBIN 0.5 0.2  --    GLUCOSE 108* 117* 116*     Recent Labs     10/16/20  0645 10/17/20  0535 10/18/20  0500   RBC 2.84* 3.31*  2.37*   HEMOGLOBIN 8.9* 10.6* 7.6*   HEMATOCRIT 28.1* 33.1* 24.0*   PLATELETCT 263 225 252   IRON  --  18*  --    FERRITIN  --  1476.0*  --    TOTIRONBC  --  103*  --      Recent Labs     10/16/20  0645 10/17/20  0535 10/18/20  0500   WBC 7.3 6.6 9.2   NEUTSPOLYS  --  72.70* 66.70   LYMPHOCYTES  --  12.60* 16.00*   MONOCYTES  --  11.60 11.40   EOSINOPHILS  --  1.40 2.00   BASOPHILS  --  0.90 0.90   ASTSGOT 19 26  --    ALTSGPT 19 29  --    ALKPHOSPHAT 127* 146*  --    TBILIRUBIN 0.5 0.2  --      Hemodynamics:  Temp (24hrs), Av.6 °C (97.9 °F), Min:36.3 °C (97.4 °F), Max:36.8 °C (98.2 °F)  Temperature: 36.6 °C (97.9 °F)  Pulse  Av.9  Min: 57  Max: 118   Blood Pressure : 110/65     Respiratory:    Respiration: 18, Pulse Oximetry: 93 %     Work Of Breathing / Effort: Mild  RUL Breath Sounds: Clear, RML Breath Sounds: Diminished, RLL Breath Sounds: Diminished, DAVIDSON Breath Sounds: Clear, LLL Breath Sounds: Diminished  Fluids:    Intake/Output Summary (Last 24 hours) at 10/17/2020 1127  Last data filed at 10/17/2020 0343  Gross per 24 hour   Intake 0 ml   Output 2200 ml   Net -2200 ml        GI/Nutrition:  Orders Placed This Encounter   Procedures   • Diet Order Diabetic (1:1,reflux prec, crush float meds in applesauce), Full Liquid     Standing Status:   Standing     Number of Occurrences:   1     Order Specific Question:   Diet:     Answer:   Diabetic [3]     Comments:   1:1,reflux prec, crush float meds in applesauce     Order Specific Question:   Diet:     Answer:   Full Liquid [11]     Order Specific Question:   Miscellaneous modifications:     Answer:   SLP - 1:1 Supervision by Nursing [21]     Order Specific Question:   Miscellaneous modifications:     Answer:   SLP - Deliver to Nursing Station [22]     Medical Decision Making, by Problem:  Active Hospital Problems    Diagnosis   • *Gastric outflow obstruction [K31.1]   • Anemia [D64.9]   • GI bleed [K92.2]   • Hypomagnesemia [E83.42]   • Recurrent left  pleural effusion [J90]   • Hiatal hernia [K44.9]   • Aspiration pneumonia (HCC) [J69.0]     PROBLEMS:  1. Nausea and vomiting. Question dysmotility  2. Abnormal UGI series.  Initial UGI SBFT here read as no hiatal hernia but after review with radiology today, he does feel there is large hiatal hernia with delayed clearance of contrast from hernia contributing to delayed emptying from esophagus.  However, contrast does flow through hernia and empties out of stomach and traverses small bowel normally    3. Hiatal hernia  4. Possible recent small bowel obstruction  5. Chronic normocytic anemia  6. Hypertension  7. Hyperlipidemia  8. Diabetes mellitus  9. Protein-calorie malnutrition, moderate     Plan:  1. Advance to GI soft diet and see how she tolerates  2. Repeat Hgb as suspect Hgb today inaccurate.  If repeat still 7, then likely proceed with EGD tomorrow.  3. May need outpatient gastric emptying study and manometry  4. Wean TPN    Addendum: Hgb remains low down 3 points since yesterday and stools positive for blood.  Plan for EGD tomorrow with Dr. Egan  for further evaluation.    Quality-Core Measures   Reviewed items::  Medications reviewed, Labs reviewed and Radiology images reviewed

## 2020-10-18 NOTE — THERAPY
Speech Language Pathology  Daily Treatment     Patient Name: Linda Haley  Age:  85 y.o., Sex:  female  Medical Record #: [id number]  Today's Date: 10/18/2020     Precautions  Precautions: (P) Swallow Precautions ( See Comments), Fall Risk  Comments: full liquid diet with reflux precautions    Assessment    The patient was seen on this date for swallow re-evaluation. Per RN, pt has been cleared for soft diet by GI. The patient was seen on this date at the end of her full liquid meal tray. The patient was consuming full liquid tray with no overt s/sx of aspiration. Vocal quality was clear, hyolaryngeal excursion was palpated as complete and swallow trigger was appreciated to be timely. The patient was provided trials of soft solids, pt consumed soft solids with no overt s/sx of aspiration. Vocal quality remained clear and patient reported no feeling of globus sensation or odynophagia. Mastication noted to be timely and functional. Patient able to feed herself during his meal without any assistance, no longer required 1:1 supervision. Recommend upgrade to soft solids with thin liquids as cleared by GI.     Plan    Continue current treatment plan.    Discharge Recommendations: (P) Recommend outpatient speech therapy services       Objective       10/18/20 1243   Dysphagia    Diet / Liquid Recommendation Thin (0);Soft & Bite-Sized (6) - (Dysphagia III)   Nutritional Liquid Intake Rating Scale Non thickened beverages   Nutritional Food Intake Rating Scale Total oral diet with multiple consistencies without special preparation but with specific food limitations

## 2020-10-18 NOTE — PROGRESS NOTES
Hospital Medicine Daily Progress Note    Date of Service  10/18/2020    Chief Complaint  85 y.o. female admitted 10/15/2020 with vomiting and possible gastric outlet obstruction    Hospital Course    50-year-old female with a past medical history of hypothyroidism, diabetes mellitus, GI bleed, gastritis, normocytic anemia, respiratory failure, aspiration pneumonia, and hiatal hernia was transferred from [facility] for potential gastric outlet obstruction secondary to hiatal hernia requiring total parenteral nutrition.       Interval Problem Update  Patient evaluated at bedside and found AAOX4, afebrile and in no acute distress  Dyspnea better this AM  Patient up to chair, had BM  Stable vitals, patient saturating well on 1 L nasal cannula  CBC with normocytic anemia, RI<2, h/o of UGIB, high BUN along with nl Cr  Repeat h/h at 7.3 and trending down slowly, possible 2/2 slow GI bleed  Pending FOBT  General surgery following, appreciate recommendations   Gastroenterology following, appreciate recommendations  NPO at midnight, possible EGD on AM  Labs on AM    Consultants/Specialty  General surgery  Gastroenterology    Code Status  DNAR/DNI    Disposition  Inpatient    Review of Systems  Review of Systems   Constitutional: Positive for malaise/fatigue. Negative for chills and fever.   HENT: Negative for hearing loss and tinnitus.    Eyes: Negative for blurred vision and double vision.   Respiratory: Positive for cough and shortness of breath.    Cardiovascular: Negative for chest pain and palpitations.   Gastrointestinal: Positive for constipation. Negative for vomiting.   Genitourinary: Negative for dysuria and urgency.   Musculoskeletal: Negative for myalgias and neck pain.   Skin: Negative for itching and rash.   Neurological: Negative for dizziness and headaches.   Psychiatric/Behavioral: Negative for depression and suicidal ideas.        Physical Exam  Temp:  [36.3 °C (97.4 °F)-36.8 °C (98.2 °F)] 36.3 °C (97.4  °F)  Pulse:  [89-93] 90  Resp:  [16-18] 18  BP: ()/(56-68) 118/68  SpO2:  [91 %-94 %] 94 %    Physical Exam  Constitutional:       General: She is not in acute distress.     Appearance: Normal appearance.   HENT:      Head: Normocephalic and atraumatic.   Eyes:      Extraocular Movements: Extraocular movements intact.      Pupils: Pupils are equal, round, and reactive to light.   Neck:      Musculoskeletal: Normal range of motion and neck supple.   Cardiovascular:      Rate and Rhythm: Normal rate and regular rhythm.      Pulses: Normal pulses.      Heart sounds: Normal heart sounds.   Pulmonary:      Effort: Pulmonary effort is normal.      Breath sounds: Rales present.   Abdominal:      General: Bowel sounds are normal. There is distension.      Palpations: Abdomen is soft.   Musculoskeletal: Normal range of motion.         General: No tenderness.      Comments: Mild B/L LE edema   Skin:     General: Skin is dry.      Coloration: Skin is not jaundiced.   Neurological:      General: No focal deficit present.      Mental Status: She is alert and oriented to person, place, and time.      Cranial Nerves: No cranial nerve deficit.   Psychiatric:         Mood and Affect: Mood normal.         Behavior: Behavior normal.         Thought Content: Thought content normal.         Judgment: Judgment normal.         Fluids    Intake/Output Summary (Last 24 hours) at 10/18/2020 0848  Last data filed at 10/18/2020 0408  Gross per 24 hour   Intake 540 ml   Output 500 ml   Net 40 ml       Laboratory  Recent Labs     10/16/20  0645 10/17/20  0535 10/18/20  0500   WBC 7.3 6.6 9.2   RBC 2.84* 3.31* 2.37*   HEMOGLOBIN 8.9* 10.6* 7.6*   HEMATOCRIT 28.1* 33.1* 24.0*   MCV 98.9* 100.0* 100.0*   MCH 31.3 32.0 31.6   MCHC 31.7* 32.0* 31.6*   RDW 51.1* 51.5* 52.5*   PLATELETCT 263 225 252   MPV 10.8 10.8 10.4     Recent Labs     10/16/20  0645 10/17/20  0535 10/18/20  0500   SODIUM 130* 131* 134*   POTASSIUM 3.8 3.7 4.2   CHLORIDE 90*  90* 94*   CO2 33 37* 36*   GLUCOSE 108* 117* 116*   BUN 25* 30* 34*   CREATININE 0.50 0.57 0.54   CALCIUM 9.6 9.2 9.5                   Imaging  IR-PICC LINE PLACEMENT W/ GUIDANCE > AGE 5   Final Result                  Ultrasound-guided PICC placement performed by qualified nursing staff as    above.          DX-UPPER GI-SMALL BOWEL FOLLOW THRU   Final Result      1.  Negative small bowel follow-through. No obstruction      2.  Moderate thoracic esophageal dysmotility and the esophagus is mildly dilated.      DX-CHEST-FOR LINE PLACEMENT Perform procedure in: Patient's Room   Final Result      Left subclavian central line identified with tip in expected location of the superior vena cava.      Left pleural fluid and left basilar consolidation.      OUTSIDE IMAGES-CT ABDOMEN /PELVIS   Final Result      OUTSIDE IMAGES-CT ABDOMEN /PELVIS   Final Result      OUTSIDE IMAGES-DX ABDOMEN   Final Result      JZ-KXZIELN-TUKXFLU FILM X-RAY   Final Result      OUTSIDE IMAGES-US VASCULAR   Final Result      OUTSIDE IMAGES-DX ABDOMEN   Final Result           Assessment/Plan  * Gastric outflow obstruction- (present on admission)  Assessment & Plan  Transfer to [facility] for surgery/GI evaluation for possible bowel obstruction and possible gastric outlet obstruction secondary to hiatal hernia   10/16 Upper GI small bowel without obstruction but she was noted for moderate thoracic esophageal dysmotility  SPL started diet, pt tolerating  TPN taper  Gastroenterology following, appreciate recommendations  General surgery following, appreciate recommendations     Anemia- (present on admission)  Assessment & Plan  Chronic normocytic anemia  Iron studies suggestive for anemia of chronic inflammation  Stable VS  Hemoglobin 7.6->7.3  Possible slow GI bleed  Gi following, possible EGD on AM  Transfuse if hemoglobin lower than 7  Labs on AM    GI bleed- (present on admission)  Assessment & Plan  Stable vitals, patient saturating well on  1 L nasal cannula  CBC with normocytic anemia, RI<2, h/o of UGIB, high BUN along with nl Cr  Repeat h/h at 7.3 and trending down slowly, possible 2/2 slow GI bleed  Pending FOBT  Continue IV PPI  Gastroenterology following, appreciate recommendations  NPO at midnight, possible EGD on AM  Labs on AM    Recurrent left pleural effusion  Assessment & Plan  Noted on outside Chest CT 10 days ago and on 10/15 CXR  Completed IV antibiotic for aspiration pneumonia at outside facility  10/2020 ECHO w/EF 70%, Pro-BNP < 400  Patient saturating well on 2 L nasal cannula  Likely secondary to atelectasis from large hiatal hernia  Continue IS    Hypomagnesemia  Assessment & Plan  Replete as necessary    Aspiration pneumonia (HCC)- (present on admission)  Assessment & Plan  Completed antibiotic regimen at outside facility, will stop to be due to possible gastric obstruction  No signs of active infection  Keep n.p.o. for now  Continue to TPN for now  SPL eval     Hiatal hernia- (present on admission)  Assessment & Plan  She has been evaluated by surgery at outside facility recommending transfer to [facility] for surgery/GI evaluation  General surgery following, appreciate recommendations        VTE prophylaxis: SCDs

## 2020-10-18 NOTE — CARE PLAN
Problem: Communication  Goal: The ability to communicate needs accurately and effectively will improve  Outcome: PROGRESSING AS EXPECTED   Participation in POC  Problem: Safety  Goal: Will remain free from injury  Outcome: PROGRESSING AS EXPECTED   Fall precautions in place  Problem: Skin Integrity  Goal: Risk for impaired skin integrity will decrease  Outcome: PROGRESSING AS EXPECTED   Q2 turns, pillows used for repositioning, waffle mattress

## 2020-10-18 NOTE — CARE PLAN
Problem: Safety  Goal: Will remain free from injury  Outcome: PROGRESSING AS EXPECTED  Goal: Will remain free from falls  Outcome: PROGRESSING AS EXPECTED     Problem: Venous Thromboembolism (VTW)/Deep Vein Thrombosis (DVT) Prevention:  Goal: Patient will participate in Venous Thrombosis (VTE)/Deep Vein Thrombosis (DVT)Prevention Measures  Outcome: PROGRESSING AS EXPECTED     Problem: Mobility  Goal: Risk for activity intolerance will decrease  Outcome: PROGRESSING SLOWER THAN EXPECTED     Problem: Respiratory:  Goal: Respiratory status will improve  Outcome: PROGRESSING SLOWER THAN EXPECTED     Problem: Psychosocial Needs:  Goal: Level of anxiety will decrease  Outcome: PROGRESSING AS EXPECTED

## 2020-10-18 NOTE — PROGRESS NOTES
Surgery General Daily Progress Note    Date of Service  10/18/2020    Chief Complaint  85 y.o. female admitted 10/15/2020 with poor PO intake, unclear etiology. GI following. Patient feels ok, tolerating clears x2 days now without pain or nausea. Started on GI soft food today    Physical Exam  Temp:  [36.3 °C (97.4 °F)-36.8 °C (98.2 °F)] 36.6 °C (97.9 °F)  Pulse:  [79-93] 79  Resp:  [16-18] 18  BP: ()/(56-68) 110/65  SpO2:  [91 %-94 %] 93 %    Physical Exam  NAD, appears well, sitting in chair  Neck supple, MMM  Regular hear rate  Normal respiratory effort  Abdomen soft, NT, ND  Ext ROM grossly intact  Skin pink warm and dry      Fluids    Intake/Output Summary (Last 24 hours) at 10/18/2020 1353  Last data filed at 10/18/2020 0748  Gross per 24 hour   Intake 780 ml   Output 800 ml   Net -20 ml       Laboratory  Recent Labs     10/16/20  0645 10/17/20  0535 10/18/20  0500   WBC 7.3 6.6 9.2   RBC 2.84* 3.31* 2.37*   HEMOGLOBIN 8.9* 10.6* 7.6*   HEMATOCRIT 28.1* 33.1* 24.0*   MCV 98.9* 100.0* 100.0*   MCH 31.3 32.0 31.6   MCHC 31.7* 32.0* 31.6*   RDW 51.1* 51.5* 52.5*   PLATELETCT 263 225 252   MPV 10.8 10.8 10.4     Recent Labs     10/16/20  0645 10/17/20  0535 10/18/20  0500   SODIUM 130* 131* 134*   POTASSIUM 3.8 3.7 4.2   CHLORIDE 90* 90* 94*   CO2 33 37* 36*   GLUCOSE 108* 117* 116*   BUN 25* 30* 34*   CREATININE 0.50 0.57 0.54   CALCIUM 9.6 9.2 9.5                       Assessment/Plan  86 y/o female with poor PO intake, on TPN, with HH. Unclear etiology of poor PO tolerance- likely combination of esophageal dysmotility, GOO or gastric ileus and possibly related to hiatal hernia  1. Agree with advancing diet to GI soft after tolerates liquids x2 days here without nausea/vomiting  2. Appreciate hospitalist care  3. Will follow with you

## 2020-10-18 NOTE — PROGRESS NOTES
Assumed care at 0700    Received report from NOC shift RN.    Reviewed recent lab results, notes, orders, and MAR  POC discussed and updated on care board  Bed is in the lowest and locked position, call light within reach      A&Ox4  Denies pain  0.5L nasal canula   Sating in the low 90's  Patient is tolerating full liquid diet   Advanced diet    +voiding   pure wick in place   +flatus  +BM   Stool sample sent  TPN  Patient is MAX assist   Q2 turns, waffle mattress, pillows used for repositioning   Patient states that she is unable to walk despite having her own walker in the room.  PT unable to see patient today.  Dilcia lift used to get patient up to chair and back to bed

## 2020-10-19 ENCOUNTER — ANESTHESIA EVENT (OUTPATIENT)
Dept: SURGERY | Facility: MEDICAL CENTER | Age: 85
DRG: 380 | End: 2020-10-19
Payer: MEDICARE

## 2020-10-19 ENCOUNTER — ANESTHESIA (OUTPATIENT)
Dept: SURGERY | Facility: MEDICAL CENTER | Age: 85
DRG: 380 | End: 2020-10-19
Payer: MEDICARE

## 2020-10-19 PROBLEM — K31.89 GASTRIC MASS: Status: ACTIVE | Noted: 2020-10-19

## 2020-10-19 LAB
ALBUMIN SERPL BCP-MCNC: 2.4 G/DL (ref 3.2–4.9)
ALBUMIN/GLOB SERPL: 0.8 G/DL
ALP SERPL-CCNC: 150 U/L (ref 30–99)
ALT SERPL-CCNC: 31 U/L (ref 2–50)
ANION GAP SERPL CALC-SCNC: 7 MMOL/L (ref 7–16)
ANISOCYTOSIS BLD QL SMEAR: ABNORMAL
AST SERPL-CCNC: 24 U/L (ref 12–45)
BASOPHILS # BLD AUTO: 0 % (ref 0–1.8)
BASOPHILS # BLD: 0 K/UL (ref 0–0.12)
BILIRUB SERPL-MCNC: 0.2 MG/DL (ref 0.1–1.5)
BUN SERPL-MCNC: 28 MG/DL (ref 8–22)
CALCIUM SERPL-MCNC: 9.2 MG/DL (ref 8.5–10.5)
CHLORIDE SERPL-SCNC: 94 MMOL/L (ref 96–112)
CO2 SERPL-SCNC: 31 MMOL/L (ref 20–33)
CREAT SERPL-MCNC: 0.58 MG/DL (ref 0.5–1.4)
EKG IMPRESSION: NORMAL
EOSINOPHIL # BLD AUTO: 0.62 K/UL (ref 0–0.51)
EOSINOPHIL NFR BLD: 6.1 % (ref 0–6.9)
ERYTHROCYTE [DISTWIDTH] IN BLOOD BY AUTOMATED COUNT: 53.6 FL (ref 35.9–50)
GLOBULIN SER CALC-MCNC: 3.1 G/DL (ref 1.9–3.5)
GLUCOSE BLD-MCNC: 103 MG/DL (ref 65–99)
GLUCOSE BLD-MCNC: 121 MG/DL (ref 65–99)
GLUCOSE BLD-MCNC: 74 MG/DL (ref 65–99)
GLUCOSE BLD-MCNC: 87 MG/DL (ref 65–99)
GLUCOSE SERPL-MCNC: 87 MG/DL (ref 65–99)
HCT VFR BLD AUTO: 23.9 % (ref 37–47)
HGB BLD-MCNC: 7.5 G/DL (ref 12–16)
HYPOCHROMIA BLD QL SMEAR: ABNORMAL
LYMPHOCYTES # BLD AUTO: 1.87 K/UL (ref 1–4.8)
LYMPHOCYTES NFR BLD: 18.3 % (ref 22–41)
MAGNESIUM SERPL-MCNC: 2.2 MG/DL (ref 1.5–2.5)
MANUAL DIFF BLD: NORMAL
MCH RBC QN AUTO: 31.3 PG (ref 27–33)
MCHC RBC AUTO-ENTMCNC: 31.4 G/DL (ref 33.6–35)
MCV RBC AUTO: 99.6 FL (ref 81.4–97.8)
MICROCYTES BLD QL SMEAR: ABNORMAL
MONOCYTES # BLD AUTO: 0.53 K/UL (ref 0–0.85)
MONOCYTES NFR BLD AUTO: 5.2 % (ref 0–13.4)
MORPHOLOGY BLD-IMP: NORMAL
MYELOCYTES NFR BLD MANUAL: 0.9 %
NEUTROPHILS # BLD AUTO: 7.01 K/UL (ref 2–7.15)
NEUTROPHILS NFR BLD: 68.7 % (ref 44–72)
NRBC # BLD AUTO: 0.07 K/UL
NRBC BLD-RTO: 0.7 /100 WBC
PATHOLOGY CONSULT NOTE: NORMAL
PHOSPHATE SERPL-MCNC: 3.1 MG/DL (ref 2.5–4.5)
PLATELET # BLD AUTO: 276 K/UL (ref 164–446)
PLATELET BLD QL SMEAR: NORMAL
PMV BLD AUTO: 10.3 FL (ref 9–12.9)
POLYCHROMASIA BLD QL SMEAR: NORMAL
POTASSIUM SERPL-SCNC: 4.4 MMOL/L (ref 3.6–5.5)
PROMYELOCYTES NFR BLD MANUAL: 0.9 %
PROT SERPL-MCNC: 5.5 G/DL (ref 6–8.2)
RBC # BLD AUTO: 2.4 M/UL (ref 4.2–5.4)
RBC BLD AUTO: PRESENT
SODIUM SERPL-SCNC: 132 MMOL/L (ref 135–145)
WBC # BLD AUTO: 10.2 K/UL (ref 4.8–10.8)

## 2020-10-19 PROCEDURE — 93005 ELECTROCARDIOGRAM TRACING: CPT | Performed by: STUDENT IN AN ORGANIZED HEALTH CARE EDUCATION/TRAINING PROGRAM

## 2020-10-19 PROCEDURE — 83735 ASSAY OF MAGNESIUM: CPT

## 2020-10-19 PROCEDURE — 700101 HCHG RX REV CODE 250: Performed by: HOSPITALIST

## 2020-10-19 PROCEDURE — A9270 NON-COVERED ITEM OR SERVICE: HCPCS | Performed by: HOSPITALIST

## 2020-10-19 PROCEDURE — 84100 ASSAY OF PHOSPHORUS: CPT

## 2020-10-19 PROCEDURE — C9113 INJ PANTOPRAZOLE SODIUM, VIA: HCPCS | Performed by: HOSPITALIST

## 2020-10-19 PROCEDURE — 700102 HCHG RX REV CODE 250 W/ 637 OVERRIDE(OP): Performed by: HOSPITALIST

## 2020-10-19 PROCEDURE — 160035 HCHG PACU - 1ST 60 MINS PHASE I: Performed by: INTERNAL MEDICINE

## 2020-10-19 PROCEDURE — 0DB68ZX EXCISION OF STOMACH, VIA NATURAL OR ARTIFICIAL OPENING ENDOSCOPIC, DIAGNOSTIC: ICD-10-PCS | Performed by: INTERNAL MEDICINE

## 2020-10-19 PROCEDURE — 88312 SPECIAL STAINS GROUP 1: CPT

## 2020-10-19 PROCEDURE — 160002 HCHG RECOVERY MINUTES (STAT): Performed by: INTERNAL MEDICINE

## 2020-10-19 PROCEDURE — 160036 HCHG PACU - EA ADDL 30 MINS PHASE I: Performed by: INTERNAL MEDICINE

## 2020-10-19 PROCEDURE — 88305 TISSUE EXAM BY PATHOLOGIST: CPT

## 2020-10-19 PROCEDURE — 80053 COMPREHEN METABOLIC PANEL: CPT

## 2020-10-19 PROCEDURE — 160202 HCHG ENDO MINUTES - 1ST 30 MINS LEVEL 3: Performed by: INTERNAL MEDICINE

## 2020-10-19 PROCEDURE — 99233 SBSQ HOSP IP/OBS HIGH 50: CPT | Performed by: STUDENT IN AN ORGANIZED HEALTH CARE EDUCATION/TRAINING PROGRAM

## 2020-10-19 PROCEDURE — 770001 HCHG ROOM/CARE - MED/SURG/GYN PRIV*

## 2020-10-19 PROCEDURE — 700105 HCHG RX REV CODE 258: Performed by: ANESTHESIOLOGY

## 2020-10-19 PROCEDURE — 160048 HCHG OR STATISTICAL LEVEL 1-5: Performed by: INTERNAL MEDICINE

## 2020-10-19 PROCEDURE — 82962 GLUCOSE BLOOD TEST: CPT | Mod: 91

## 2020-10-19 PROCEDURE — 700111 HCHG RX REV CODE 636 W/ 250 OVERRIDE (IP): Performed by: ANESTHESIOLOGY

## 2020-10-19 PROCEDURE — 160207 HCHG ENDO MINUTES - EA ADDL 1 MIN LEVEL 3: Performed by: INTERNAL MEDICINE

## 2020-10-19 PROCEDURE — 93010 ELECTROCARDIOGRAM REPORT: CPT | Performed by: INTERNAL MEDICINE

## 2020-10-19 PROCEDURE — 85007 BL SMEAR W/DIFF WBC COUNT: CPT

## 2020-10-19 PROCEDURE — 700111 HCHG RX REV CODE 636 W/ 250 OVERRIDE (IP): Performed by: HOSPITALIST

## 2020-10-19 PROCEDURE — 700101 HCHG RX REV CODE 250: Performed by: ANESTHESIOLOGY

## 2020-10-19 PROCEDURE — 85027 COMPLETE CBC AUTOMATED: CPT

## 2020-10-19 PROCEDURE — 160009 HCHG ANES TIME/MIN: Performed by: INTERNAL MEDICINE

## 2020-10-19 RX ORDER — MEPERIDINE HYDROCHLORIDE 25 MG/ML
25 INJECTION INTRAMUSCULAR; INTRAVENOUS; SUBCUTANEOUS
Status: DISCONTINUED | OUTPATIENT
Start: 2020-10-19 | End: 2020-10-19 | Stop reason: HOSPADM

## 2020-10-19 RX ORDER — DIPHENHYDRAMINE HYDROCHLORIDE 50 MG/ML
12.5 INJECTION INTRAMUSCULAR; INTRAVENOUS
Status: DISCONTINUED | OUTPATIENT
Start: 2020-10-19 | End: 2020-10-19 | Stop reason: HOSPADM

## 2020-10-19 RX ORDER — DEXAMETHASONE SODIUM PHOSPHATE 4 MG/ML
INJECTION, SOLUTION INTRA-ARTICULAR; INTRALESIONAL; INTRAMUSCULAR; INTRAVENOUS; SOFT TISSUE PRN
Status: DISCONTINUED | OUTPATIENT
Start: 2020-10-19 | End: 2020-10-19 | Stop reason: SURG

## 2020-10-19 RX ORDER — LIDOCAINE HYDROCHLORIDE 20 MG/ML
INJECTION, SOLUTION EPIDURAL; INFILTRATION; INTRACAUDAL; PERINEURAL PRN
Status: DISCONTINUED | OUTPATIENT
Start: 2020-10-19 | End: 2020-10-19 | Stop reason: SURG

## 2020-10-19 RX ORDER — KETOROLAC TROMETHAMINE 30 MG/ML
INJECTION, SOLUTION INTRAMUSCULAR; INTRAVENOUS PRN
Status: DISCONTINUED | OUTPATIENT
Start: 2020-10-19 | End: 2020-10-19 | Stop reason: SURG

## 2020-10-19 RX ORDER — MIDAZOLAM HYDROCHLORIDE 1 MG/ML
1 INJECTION INTRAMUSCULAR; INTRAVENOUS
Status: DISCONTINUED | OUTPATIENT
Start: 2020-10-19 | End: 2020-10-19 | Stop reason: HOSPADM

## 2020-10-19 RX ORDER — LABETALOL HYDROCHLORIDE 5 MG/ML
5 INJECTION, SOLUTION INTRAVENOUS
Status: DISCONTINUED | OUTPATIENT
Start: 2020-10-19 | End: 2020-10-19 | Stop reason: HOSPADM

## 2020-10-19 RX ORDER — HYDROMORPHONE HYDROCHLORIDE 1 MG/ML
0.5 INJECTION, SOLUTION INTRAMUSCULAR; INTRAVENOUS; SUBCUTANEOUS
Status: DISCONTINUED | OUTPATIENT
Start: 2020-10-19 | End: 2020-10-19 | Stop reason: HOSPADM

## 2020-10-19 RX ORDER — ONDANSETRON 2 MG/ML
4 INJECTION INTRAMUSCULAR; INTRAVENOUS
Status: DISCONTINUED | OUTPATIENT
Start: 2020-10-19 | End: 2020-10-19 | Stop reason: HOSPADM

## 2020-10-19 RX ORDER — MIDAZOLAM HYDROCHLORIDE 1 MG/ML
INJECTION INTRAMUSCULAR; INTRAVENOUS PRN
Status: DISCONTINUED | OUTPATIENT
Start: 2020-10-19 | End: 2020-10-19 | Stop reason: SURG

## 2020-10-19 RX ORDER — SODIUM CHLORIDE, SODIUM LACTATE, POTASSIUM CHLORIDE, CALCIUM CHLORIDE 600; 310; 30; 20 MG/100ML; MG/100ML; MG/100ML; MG/100ML
INJECTION, SOLUTION INTRAVENOUS
Status: DISCONTINUED | OUTPATIENT
Start: 2020-10-19 | End: 2020-10-19 | Stop reason: SURG

## 2020-10-19 RX ORDER — SODIUM CHLORIDE, SODIUM LACTATE, POTASSIUM CHLORIDE, CALCIUM CHLORIDE 600; 310; 30; 20 MG/100ML; MG/100ML; MG/100ML; MG/100ML
INJECTION, SOLUTION INTRAVENOUS CONTINUOUS
Status: DISCONTINUED | OUTPATIENT
Start: 2020-10-19 | End: 2020-10-19 | Stop reason: HOSPADM

## 2020-10-19 RX ORDER — HYDROMORPHONE HYDROCHLORIDE 1 MG/ML
0.2 INJECTION, SOLUTION INTRAMUSCULAR; INTRAVENOUS; SUBCUTANEOUS
Status: DISCONTINUED | OUTPATIENT
Start: 2020-10-19 | End: 2020-10-19 | Stop reason: HOSPADM

## 2020-10-19 RX ORDER — SUCCINYLCHOLINE/SOD CL,ISO/PF 200MG/10ML
SYRINGE (ML) INTRAVENOUS PRN
Status: DISCONTINUED | OUTPATIENT
Start: 2020-10-19 | End: 2020-10-19 | Stop reason: SURG

## 2020-10-19 RX ORDER — OXYCODONE HCL 5 MG/5 ML
5 SOLUTION, ORAL ORAL
Status: DISCONTINUED | OUTPATIENT
Start: 2020-10-19 | End: 2020-10-19 | Stop reason: HOSPADM

## 2020-10-19 RX ORDER — HYDROMORPHONE HYDROCHLORIDE 1 MG/ML
0.1 INJECTION, SOLUTION INTRAMUSCULAR; INTRAVENOUS; SUBCUTANEOUS
Status: DISCONTINUED | OUTPATIENT
Start: 2020-10-19 | End: 2020-10-19 | Stop reason: HOSPADM

## 2020-10-19 RX ORDER — IPRATROPIUM BROMIDE AND ALBUTEROL SULFATE 2.5; .5 MG/3ML; MG/3ML
3 SOLUTION RESPIRATORY (INHALATION)
Status: DISCONTINUED | OUTPATIENT
Start: 2020-10-19 | End: 2020-10-19 | Stop reason: HOSPADM

## 2020-10-19 RX ORDER — ONDANSETRON 2 MG/ML
INJECTION INTRAMUSCULAR; INTRAVENOUS PRN
Status: DISCONTINUED | OUTPATIENT
Start: 2020-10-19 | End: 2020-10-19 | Stop reason: SURG

## 2020-10-19 RX ORDER — OXYCODONE HCL 5 MG/5 ML
10 SOLUTION, ORAL ORAL
Status: DISCONTINUED | OUTPATIENT
Start: 2020-10-19 | End: 2020-10-19 | Stop reason: HOSPADM

## 2020-10-19 RX ADMIN — MIDAZOLAM HYDROCHLORIDE 2 MG: 1 INJECTION, SOLUTION INTRAMUSCULAR; INTRAVENOUS at 09:40

## 2020-10-19 RX ADMIN — SODIUM CHLORIDE, PRESERVATIVE FREE 10 ML: 5 INJECTION INTRAVENOUS at 17:30

## 2020-10-19 RX ADMIN — FENTANYL CITRATE 100 MCG: 50 INJECTION, SOLUTION INTRAMUSCULAR; INTRAVENOUS at 09:43

## 2020-10-19 RX ADMIN — SODIUM CHLORIDE, POTASSIUM CHLORIDE, SODIUM LACTATE AND CALCIUM CHLORIDE: 600; 310; 30; 20 INJECTION, SOLUTION INTRAVENOUS at 09:40

## 2020-10-19 RX ADMIN — DEXAMETHASONE SODIUM PHOSPHATE 4 MG: 4 INJECTION, SOLUTION INTRA-ARTICULAR; INTRALESIONAL; INTRAMUSCULAR; INTRAVENOUS; SOFT TISSUE at 09:50

## 2020-10-19 RX ADMIN — ONDANSETRON 4 MG: 2 INJECTION INTRAMUSCULAR; INTRAVENOUS at 09:50

## 2020-10-19 RX ADMIN — GABAPENTIN 200 MG: 100 CAPSULE ORAL at 17:24

## 2020-10-19 RX ADMIN — SODIUM CHLORIDE, PRESERVATIVE FREE 10 ML: 5 INJECTION INTRAVENOUS at 04:25

## 2020-10-19 RX ADMIN — PANTOPRAZOLE SODIUM 40 MG: 40 INJECTION, POWDER, LYOPHILIZED, FOR SOLUTION INTRAVENOUS at 17:24

## 2020-10-19 RX ADMIN — LEVOTHYROXINE SODIUM 100 MCG: 0.05 TABLET ORAL at 04:19

## 2020-10-19 RX ADMIN — KETOROLAC TROMETHAMINE 5 MG: 30 INJECTION, SOLUTION INTRAMUSCULAR at 09:54

## 2020-10-19 RX ADMIN — PROPOFOL 50 MG: 10 INJECTION, EMULSION INTRAVENOUS at 09:50

## 2020-10-19 RX ADMIN — PANTOPRAZOLE SODIUM 40 MG: 40 INJECTION, POWDER, LYOPHILIZED, FOR SOLUTION INTRAVENOUS at 04:19

## 2020-10-19 RX ADMIN — PROPOFOL 50 MG: 10 INJECTION, EMULSION INTRAVENOUS at 09:43

## 2020-10-19 RX ADMIN — NYSTATIN: 100000 POWDER TOPICAL at 04:19

## 2020-10-19 RX ADMIN — NYSTATIN: 100000 POWDER TOPICAL at 17:24

## 2020-10-19 RX ADMIN — Medication 70 MG: at 09:43

## 2020-10-19 RX ADMIN — LIDOCAINE HYDROCHLORIDE 40 MG: 20 INJECTION, SOLUTION EPIDURAL; INFILTRATION; INTRACAUDAL at 09:43

## 2020-10-19 ASSESSMENT — ENCOUNTER SYMPTOMS
COUGH: 1
BLURRED VISION: 0
SHORTNESS OF BREATH: 1
CONSTIPATION: 1
ABDOMINAL PAIN: 0
CHILLS: 0
NAUSEA: 0
PALPITATIONS: 0
NECK PAIN: 0
DOUBLE VISION: 0
HEADACHES: 0
VOMITING: 0
FEVER: 0
MYALGIAS: 0
DIZZINESS: 0
DEPRESSION: 0

## 2020-10-19 ASSESSMENT — PAIN DESCRIPTION - PAIN TYPE
TYPE: SURGICAL PAIN
TYPE: CHRONIC PAIN
TYPE: ACUTE PAIN
TYPE: SURGICAL PAIN

## 2020-10-19 ASSESSMENT — PAIN SCALES - GENERAL: PAIN_LEVEL: 1

## 2020-10-19 NOTE — ANESTHESIA TIME REPORT
Anesthesia Start and Stop Event Times     Date Time Event    10/19/2020 0905 Ready for Procedure     0940 Anesthesia Start     1018 Anesthesia Stop        Responsible Staff  10/19/20    Name Role Begin End    Renny Wilder M.D. Anesth 0940 1018        Preop Diagnosis (Free Text):  Pre-op Diagnosis     vomiting        Preop Diagnosis (Codes):    Post op Diagnosis  Gastric outlet obstruction      Premium Reason  Non-Premium    Comments:

## 2020-10-19 NOTE — PROGRESS NOTES
[facility] Medicine Daily Progress Note    Date of Service  10/19/2020    Chief Complaint  85 y.o. female admitted 10/15/2020 with vomiting and possible gastric outlet obstruction    Hospital Course    50-year-old female with a past medical history of hypothyroidism, diabetes mellitus, GI bleed, gastritis, normocytic anemia, respiratory failure, aspiration pneumonia, and hiatal hernia was transferred from [facility] for potential gastric outlet obstruction secondary to hiatal hernia requiring total parenteral nutrition.  Pull through without any signs of obstruction.  Patient was started on liquid diet and TPN was tapered.  She underwent EGD on 10/19/2020 for which she was noted for  large amount of retained coffee-ground like debris and solid vegetable Matter, Abnormal fundic mucosa with question of mass (Bx taken) and Abnormal fundic mucosa with question of mass        Interval Problem Update  Patient evaluated at bedside and found AAOX4, afebrile and in no acute distress  Dyspnea better this AM  Stable vitals, patient saturating well on 1 L nasal cannula  10/19 EGD showed large amount of retained coffee-ground like debris and solid vegetable matter, Abnormal fundic mucosa with question of mass (Bx taken) and Abnormal fundic mucosa with question of mass  Clear liquid diet  Labs on AM    Consultants/Specialty  General surgery  Gastroenterology    Code Status  DNAR/DNI    Disposition  Inpatient    Review of Systems  Review of Systems   Constitutional: Positive for malaise/fatigue. Negative for chills and fever.   HENT: Negative for hearing loss and tinnitus.    Eyes: Negative for blurred vision and double vision.   Respiratory: Positive for cough and shortness of breath.    Cardiovascular: Negative for chest pain and palpitations.   Gastrointestinal: Positive for constipation. Negative for vomiting.   Genitourinary: Negative for dysuria and urgency.   Musculoskeletal: Negative for myalgias and neck pain.   Skin:  Negative for itching and rash.   Neurological: Negative for dizziness and headaches.   Psychiatric/Behavioral: Negative for depression and suicidal ideas.        Physical Exam  Temp:  [36.6 °C (97.9 °F)-36.9 °C (98.5 °F)] 36.8 °C (98.2 °F)  Pulse:  [81-96] 90  Resp:  [16-18] 16  BP: (122-139)/(63-75) 139/73  SpO2:  [90 %-95 %] 95 %    Physical Exam  Constitutional:       General: She is not in acute distress.     Appearance: Normal appearance.   HENT:      Head: Normocephalic and atraumatic.   Eyes:      Extraocular Movements: Extraocular movements intact.      Pupils: Pupils are equal, round, and reactive to light.   Neck:      Musculoskeletal: Normal range of motion and neck supple.   Cardiovascular:      Rate and Rhythm: Normal rate and regular rhythm.      Pulses: Normal pulses.      Heart sounds: Normal heart sounds.   Pulmonary:      Effort: Pulmonary effort is normal.      Breath sounds: Rales present.   Abdominal:      General: Bowel sounds are normal. There is distension.      Palpations: Abdomen is soft.   Musculoskeletal: Normal range of motion.         General: No tenderness.      Comments: Mild B/L LE edema   Skin:     General: Skin is dry.      Coloration: Skin is not jaundiced.   Neurological:      General: No focal deficit present.      Mental Status: She is alert and oriented to person, place, and time.      Cranial Nerves: No cranial nerve deficit.   Psychiatric:         Mood and Affect: Mood normal.         Behavior: Behavior normal.         Thought Content: Thought content normal.         Judgment: Judgment normal.         Fluids    Intake/Output Summary (Last 24 hours) at 10/19/2020 0854  Last data filed at 10/19/2020 0800  Gross per 24 hour   Intake 240 ml   Output --   Net 240 ml       Laboratory  Recent Labs     10/17/20  0535 10/18/20  0500 10/18/20  1350 10/19/20  0415   WBC 6.6 9.2  --  10.2   RBC 3.31* 2.37*  --  2.40*   HEMOGLOBIN 10.6* 7.6* 7.3* 7.5*   HEMATOCRIT 33.1* 24.0* 23.0* 23.9*    .0* 100.0*  --  99.6*   MCH 32.0 31.6  --  31.3   MCHC 32.0* 31.6*  --  31.4*   RDW 51.5* 52.5*  --  53.6*   PLATELETCT 225 252  --  276   MPV 10.8 10.4  --  10.3     Recent Labs     10/17/20  0535 10/18/20  0500 10/19/20  0415   SODIUM 131* 134* 132*   POTASSIUM 3.7 4.2 4.4   CHLORIDE 90* 94* 94*   CO2 37* 36* 31   GLUCOSE 117* 116* 87   BUN 30* 34* 28*   CREATININE 0.57 0.54 0.58   CALCIUM 9.2 9.5 9.2                   Imaging  IR-PICC LINE PLACEMENT W/ GUIDANCE > AGE 5   Final Result                  Ultrasound-guided PICC placement performed by qualified nursing staff as    above.          DX-UPPER GI-SMALL BOWEL FOLLOW THRU   Final Result      1.  Negative small bowel follow-through. No obstruction      2.  Moderate thoracic esophageal dysmotility and the esophagus is mildly dilated.      DX-CHEST-FOR LINE PLACEMENT Perform procedure in: Patient's Room   Final Result      Left subclavian central line identified with tip in expected location of the superior vena cava.      Left pleural fluid and left basilar consolidation.      OUTSIDE IMAGES-CT ABDOMEN /PELVIS   Final Result      OUTSIDE IMAGES-CT ABDOMEN /PELVIS   Final Result      OUTSIDE IMAGES-DX ABDOMEN   Final Result      TQ-[id number] FILM X-RAY   Final Result      OUTSIDE IMAGES-US VASCULAR   Final Result      OUTSIDE IMAGES-DX ABDOMEN   Final Result           Assessment/Plan  * Gastric outflow obstruction- (present on admission)  Assessment & Plan  Transfer to [facility] for surgery/GI evaluation for possible bowel obstruction and possible gastric outlet obstruction secondary to hiatal hernia   10/16 Upper GI small bowel without obstruction but she was noted for moderate thoracic esophageal dysmotility  SPL started diet, pt tolerating  TPN taper  10/19 EGD showed large amount of retained coffee-ground like debris and solid vegetable Matter, Abnormal fundic mucosa with question of mass (Bx taken) and Abnormal fundic mucosa with  question of mass  Clear liquid diet  Follow Bx results    Anemia- (present on admission)  Assessment & Plan  Chronic normocytic anemia  Iron studies suggestive for anemia of chronic inflammation vs anemia from malignancy  Stable VS  Hemoglobin 7.6->7.3->7.5  Possible slow GI bleed  10/19 EGD w/coffee ground   Transfuse if hemoglobin lower than 7  Labs on AM    GI bleed- (present on admission)  Assessment & Plan  Stable vitals, patient saturating well on 1 L nasal cannula  CBC with normocytic anemia, RI<2, h/o of UGIB, high BUN along with nl Cr  Repeat h/h at 7.3 and trending down slowly, possible 2/2 slow GI bleed  + FOBT  Continue IV PPI  10/19 EGD showed large amount of retained coffee-ground like debris and solid vegetable matter, Abnormal fundic mucosa with question of mass (Bx taken) and Abnormal fundic mucosa with question of mass  Labs on AM    Gastric mass  Assessment & Plan  10/19 EGD note gastric mass  GI following  Follow Bx results    Recurrent left pleural effusion  Assessment & Plan  Noted on outside Chest CT 10 days ago and on 10/15 CXR  Completed IV antibiotic for aspiration pneumonia at outside facility  10/2020 ECHO w/EF 70%, Pro-BNP < 400  Patient saturating well on 2 L nasal cannula  Likely secondary to atelectasis from large hiatal hernia  Continue IS    Hypomagnesemia  Assessment & Plan  Replete as necessary    Aspiration pneumonia (HCC)- (present on admission)  Assessment & Plan  Completed antibiotic regimen at outside facility, will stop to be due to possible gastric obstruction  No signs of active infection  Keep n.p.o. for now  Continue to TPN for now  SPL eval     Hiatal hernia- (present on admission)  Assessment & Plan  She has been evaluated by surgery at outside facility recommending transfer to [facility] for surgery/GI evaluation  General surgery following, appreciate recommendations        VTE prophylaxis: SCDs, has UGIB

## 2020-10-19 NOTE — CARE PLAN
Problem: Nutritional:  Goal: Nutrition support tolerated and meeting greater than 85% of estimated needs  Outcome: TPN discontinued, see dietary progress note.

## 2020-10-19 NOTE — ANESTHESIA PROCEDURE NOTES
Airway    Date/Time: 10/19/2020 9:44 AM  Performed by: Renny Wilder M.D.  Authorized by: Renny Wilder M.D.     Location:  OR  Urgency:  Elective  Indications for Airway Management:  Anesthesia      Spontaneous Ventilation: absent    Sedation Level:  Deep  Preoxygenated: Yes    Patient Position:  Sniffing  Final Airway Type:  Endotracheal airway  Final Endotracheal Airway:  ETT  Cuffed: Yes    Technique Used for Successful ETT Placement:  Direct laryngoscopy    Insertion Site:  Oral  Blade Type:  Dillon  Laryngoscope Blade/Videolaryngoscope Blade Size:  3  ETT Size (mm):  6.5  Measured from:  Teeth  ETT to Teeth (cm):  23  Placement Verified by: auscultation and capnometry    Cormack-Lehane Classification:  Grade III - view of epiglottis only  Number of Attempts at Approach:  1

## 2020-10-19 NOTE — PROCEDURES
DATE OF SERVICE:  10/19/2020    PROCEDURE:  Esophagogastroduodenoscopy with biopsies.    PREPROCEDURE DIAGNOSES:  An 85-year-old female admitted for vomiting and   concern for gastric outlet obstruction.  Patient has a known 5 cm hiatal   hernia.  She had an upper gastrointestinal several days ago showing esophageal   dysmotility, but no gastric outlet obstruction.    POSTPROCEDURE DIAGNOSES:  1.  Large amount of retained coffee-ground like debris and solid vegetable   matter, limiting endoscopic visualization.  2.  Abnormal fundic mucosa with question of mass, biopsied.  3.  Large duodenal diverticulum.    CONSENT:  Risks, benefits, and alternatives explained to patient.  Patient   gave consent to proceed.    ANESTHESIA:  General endotracheal anesthesia.    ANESTHESIOLOGIST:  Renny Wilder MD    DESCRIPTION OF PROCEDURE:  Patient was turned in the left lateral decubitus   position and anesthesia monitoring was in place.  The Olympus adult flexible   endoscope was inserted into the esophagus under direct visualization.    Coffee-ground debris was noted to be refluxing into the esophagus and was   removed.  Upon entering the proximal stomach, there was a very large amount of   coffee-ground debris with solid vegetable matter.  It was very difficult to   remove this material from the stomach.  The endoscope was advanced to the   antrum.  No mucosal abnormalities in the antrum.  I was able to advance the   endoscope through the pylorus without difficulty.  No mucosal abnormalities in   the duodenal bulb, but within the second portion of the duodenum, there was a   4-5 cm diverticulum on the superior aspect of the wall.  Endoscope was pulled   back into the stomach and retroflexion was performed.  The mucosa along the   lesser curvature appeared almost ulcerated, but no active bleeding.  There was   a large mucoid mass.  No distinct tumor was appreciated.  Multiple biopsies   were taken.  The mucosa was fibrotic  and firm raising the possibility of   neoplasm.  Retroflexion was taken down, air was removed, and the endoscope was   removed.  Patient tolerated the procedure well with no complications.    Anesthesia will speak to PACU about keeping the head of the bed elevated so   that the patient will not aspirate.    RECOMMENDATIONS:  I am going to recommend clear liquid diet next 24 hours.  If   biopsies are nondiagnostic, she would be a good candidate for a followup EGD.       ____________________________________     MD CAMERON ORELLANA / MARY    DD:  10/19/2020 10:24:11  DT:  10/19/2020 13:14:41    D#:  [id number]  Job#:  [id number]

## 2020-10-19 NOTE — PROGRESS NOTES
Assumed care at 0700    Received report from NOC shift RN.    Reviewed recent lab results, notes, orders, and MAR  POC discussed and updated on care board  Bed is in the lowest and locked position, call light within reach      A&Ox4  0.5L nasal canula  CHG bath done  Right upper arm double lumen PICC  Incontinent  +BM  +voiding   Purewick in place  NPO since midnight  2 Max assist  Denies pain intervention at this time  Q2 turns, waffle mattress, pillows for repositioning

## 2020-10-19 NOTE — CARE PLAN
Problem: Communication  Goal: The ability to communicate needs accurately and effectively will improve  Outcome: PROGRESSING AS EXPECTED   Participation in POC  Problem: Safety  Goal: Will remain free from injury  Outcome: PROGRESSING AS EXPECTED   Fall precautions in place  Problem: Skin Integrity  Goal: Risk for impaired skin integrity will decrease  Outcome: PROGRESSING AS EXPECTED   Q2 turns, waffle mattress

## 2020-10-19 NOTE — CARE PLAN
Problem: Nutritional:  Goal: Achieve adequate nutritional intake  Description: Diet adv vs nutrition support as appropriate with plan of care.   Outcome: NOT MET

## 2020-10-19 NOTE — ANESTHESIA PREPROCEDURE EVALUATION
Relevant Problems   PULMONARY   (+) Aspiration pneumonia (HCC)      GI   (+) Hiatal hernia       Physical Exam    Airway   Mallampati: II  TM distance: >3 FB  Neck ROM: full       Cardiovascular - normal exam  Rhythm: regular  Rate: normal  (-) murmur     Dental - normal exam           Pulmonary - normal exam  Breath sounds clear to auscultation     Abdominal    Neurological - normal exam                 Anesthesia Plan    ASA 4       Plan - general       Airway plan will be ETT        Induction: intravenous    Postoperative Plan: Postoperative administration of opioids is intended.    Pertinent diagnostic labs and testing reviewed    Informed Consent:    Anesthetic plan and risks discussed with patient.    Use of blood products discussed with: patient whom consented to blood products.

## 2020-10-19 NOTE — OR NURSING
1013 pt arrived from OR. Mask on 4L. LR infusing to rt arm. Pt laying on left side and head elevated for risk of aspiration.     1030 pt awake to voice. Denies pain/nausea. Dr. Egan at bedside updating patient on findings.     1035 report given to ANGELICA Weiss who will receive patient back to T437.    1045 transport requested to take patient back to T437. Mask removed and NC placed on 3L, maintaining 96%    1100 pt repositioned for comfort. Face cleaned off and lip balm given.

## 2020-10-19 NOTE — PROGRESS NOTES
Pt AO x 3  Vitals signs stable  Pt denies chest pain or SOB  O2 sat >90% on .5L NC breathing unlabored,  monitoring in place.    Pt endorses mild pain, declines pain meds.   Pt denies N/V/D  Tolerating gi soft diet, will be NPO at midnight.   + voiding w.purewick, + flatus, + bowel sounds  q2h turns.     Updated plan of care discussed with pt. Safety education done. Falls precautions in place.   Pt safety maintained. Hourly rounding done.

## 2020-10-19 NOTE — OR SURGEON
Immediate Post OP Note    PreOp Diagnosis: GOO    PostOp Diagnosis: large amount of retained coffee grounds like debris and solid vegetable matter, abnormal fundic mucosa with question of mass, biopsied; large duodenal diverticulum    Procedure(s):  GASTROSCOPY - Wound Class: Clean Contaminated    Surgeon(s):  Priscilla Egan M.D.    Anesthesiologist/Type of Anesthesia:  Anesthesiologist: Renny Wilder M.D./General    Surgical Staff:  Endoscopy Technician: Tameka Colón C.N.A.; Eveline May  Endoscopy Nurse: Gagan Ballard R.N.    Specimens removed if any:  ID Type Source Tests Collected by Time Destination   A : gastric fundus biopsy Tissue Gastric PATHOLOGY SPECIMEN Priscilla Egan M.D. 10/19/2020  9:54 AM        Estimated Blood Loss: 0    Findings:   Esoph:  Coffee ground debris refluxing into esophagus  Stomach: several hundred cc's of coffee grounds material with vegetable matter.  Limited visualization of mucosa but appears to be large ulcerated area lesser curvature of fundus with mucoid mass.  Mucosa is firm and difficult to biopsy.  Duod:  4-5cm diverticulum superior aspect of second portion of duodenum    Complications: none        10/19/2020 10:15 AM Priscilla Egan M.D.

## 2020-10-19 NOTE — DIETARY
Nutrition Services: TPN stopped on 10/17 per MAR. Diet= Clear liquids, just changed today. Previously on Diabetic diet on 10/18; however pt had EGD today for GIB. Per GI notes, biopsies taken today of large mass in stomach and RN states possible surgery on WED; pt to go NPO tonight.       Plan/Rec: If unable to advance diet in next 24-48 hours, consider resuming TPN to meet nutritional needs. RD following for plan of care.

## 2020-10-19 NOTE — PROGRESS NOTES
Progress Note               Author: Luis Reddy M.D. Date & Time created: 10/19/2020  2:05 PM     Interval History:  Tolerating diet normally   No pain   EGD with significant finding   GI workup continues   n o surgery today     Review of Systems:  Review of Systems   Gastrointestinal: Negative for abdominal pain, nausea and vomiting.   All other systems reviewed and are negative.      Physical Exam:  Physical Exam  Vitals signs and nursing note reviewed. Exam conducted with a chaperone present.   HENT:      Head: Normocephalic.      Nose: Nose normal.   Eyes:      Pupils: Pupils are equal, round, and reactive to light.   Pulmonary:      Breath sounds: Normal breath sounds.   Abdominal:      General: Abdomen is flat. There is no distension.      Palpations: Abdomen is soft.   Musculoskeletal: Normal range of motion.   Neurological:      General: No focal deficit present.      Mental Status: She is alert.   Psychiatric:         Mood and Affect: Mood normal.         Labs:          Recent Labs     10/17/20  0535 10/18/20  0500 10/19/20  0415   SODIUM 131* 134* 132*   POTASSIUM 3.7 4.2 4.4   CHLORIDE 90* 94* 94*   CO2 37* 36* 31   BUN 30* 34* 28*   CREATININE 0.57 0.54 0.58   MAGNESIUM 2.3 2.5 2.2   PHOSPHORUS 3.2 2.7 3.1   CALCIUM 9.2 9.5 9.2     Recent Labs     10/17/20  0535 10/18/20  0500 10/19/20  0415   ALTSGPT 29  --  31   ASTSGOT 26  --  24   ALKPHOSPHAT 146*  --  150*   TBILIRUBIN 0.2  --  0.2   GLUCOSE 117* 116* 87     Recent Labs     10/17/20  0535 10/18/20  0500 10/18/20  1350 10/19/20  0415   RBC 3.31* 2.37*  --  2.40*   HEMOGLOBIN 10.6* 7.6* 7.3* 7.5*   HEMATOCRIT 33.1* 24.0* 23.0* 23.9*   PLATELETCT 225 252  --  276   IRON 18*  --   --   --    FERRITIN 1476.0*  --   --   --    TOTIRONBC 103*  --   --   --      Recent Labs     10/17/20  0535 10/18/20  0500 10/19/20  0415   WBC 6.6 9.2 10.2   NEUTSPOLYS 72.70* 66.70 68.70   LYMPHOCYTES 12.60* 16.00* 18.30*   MONOCYTES 11.60 11.40 5.20   EOSINOPHILS 1.40  2.00 6.10   BASOPHILS 0.90 0.90 0.00   ASTSGOT 26  --  24   ALTSGPT 29  --  31   ALKPHOSPHAT 146*  --  150*   TBILIRUBIN 0.2  --  0.2     Hemodynamics:  Temp (24hrs), Av.7 °C (98.1 °F), Min:36.2 °C (97.2 °F), Max:37 °C (98.6 °F)  Temperature: 36.6 °C (97.9 °F)  Pulse  Av  Min: 57  Max: 118   Blood Pressure : 143/82     Respiratory:    Respiration: 18, Pulse Oximetry: 98 %     Work Of Breathing / Effort: Mild  RUL Breath Sounds: Clear, RML Breath Sounds: Diminished, RLL Breath Sounds: Diminished, DAVIDSON Breath Sounds: Clear, LLL Breath Sounds: Diminished  Fluids:    Intake/Output Summary (Last 24 hours) at 10/19/2020 1405  Last data filed at 10/19/2020 1300  Gross per 24 hour   Intake 220 ml   Output --   Net 220 ml        GI/Nutrition:  Orders Placed This Encounter   Procedures   • Diet Order Clear Liquid     Standing Status:   Standing     Number of Occurrences:   1     Order Specific Question:   Diet:     Answer:   Clear Liquid [10]     Medical Decision Making, by Problem:  Active Hospital Problems    Diagnosis   • *Gastric outflow obstruction [K31.1]   • Anemia [D64.9]   • GI bleed [K92.2]   • Hypomagnesemia [E83.42]   • Recurrent left pleural effusion [J90]   • Hiatal hernia [K44.9]   • Aspiration pneumonia (HCC) [J69.0]       Plan:  As above  30 minutes     Quality-Core Measures

## 2020-10-19 NOTE — ANESTHESIA POSTPROCEDURE EVALUATION
Patient: Linda Haley    Procedure Summary     Date: 10/19/20 Room / Location: [facility] ROOM 26 / SURGERY SAME DAY [facility]    Anesthesia Start: 0940 Anesthesia Stop: 1018    Procedure: GASTROSCOPY (N/A Esophagus) Diagnosis: (duodenal diverticulum)    Surgeon: Priscilla Egan M.D. Responsible Provider: Renny Wilder M.D.    Anesthesia Type: general ASA Status: 4          Final Anesthesia Type: general  Last vitals  BP   Blood Pressure : 102/67    Temp   36.2 °C (97.2 °F)    Pulse   Pulse: 89   Resp   20    SpO2   98 %      Anesthesia Post Evaluation    Patient location during evaluation: PACU  Patient participation: complete - patient participated  Level of consciousness: awake and alert  Pain score: 1    Airway patency: patent  Anesthetic complications: no  Cardiovascular status: hemodynamically stable  Respiratory status: acceptable  Hydration status: euvolemic    PONV: none           Nurse Pain Score: 0 (NPRS)

## 2020-10-20 PROBLEM — E11.9 TYPE 2 DIABETES MELLITUS WITHOUT COMPLICATION, WITH LONG-TERM CURRENT USE OF INSULIN (HCC): Status: ACTIVE | Noted: 2020-10-20

## 2020-10-20 PROBLEM — Z79.4 TYPE 2 DIABETES MELLITUS WITHOUT COMPLICATION, WITH LONG-TERM CURRENT USE OF INSULIN (HCC): Status: ACTIVE | Noted: 2020-10-20

## 2020-10-20 PROBLEM — E03.8 OTHER SPECIFIED HYPOTHYROIDISM: Status: ACTIVE | Noted: 2020-10-20

## 2020-10-20 LAB
ALBUMIN SERPL BCP-MCNC: 2.5 G/DL (ref 3.2–4.9)
ALBUMIN/GLOB SERPL: 0.8 G/DL
ALP SERPL-CCNC: 143 U/L (ref 30–99)
ALT SERPL-CCNC: 29 U/L (ref 2–50)
ANION GAP SERPL CALC-SCNC: 5 MMOL/L (ref 7–16)
ANISOCYTOSIS BLD QL SMEAR: ABNORMAL
AST SERPL-CCNC: 22 U/L (ref 12–45)
BASOPHILS # BLD AUTO: 0 % (ref 0–1.8)
BASOPHILS # BLD: 0 K/UL (ref 0–0.12)
BILIRUB SERPL-MCNC: 0.2 MG/DL (ref 0.1–1.5)
BUN SERPL-MCNC: 24 MG/DL (ref 8–22)
CALCIUM SERPL-MCNC: 9.6 MG/DL (ref 8.5–10.5)
CHLORIDE SERPL-SCNC: 94 MMOL/L (ref 96–112)
CO2 SERPL-SCNC: 32 MMOL/L (ref 20–33)
CREAT SERPL-MCNC: 0.53 MG/DL (ref 0.5–1.4)
EOSINOPHIL # BLD AUTO: 0.61 K/UL (ref 0–0.51)
EOSINOPHIL NFR BLD: 6 % (ref 0–6.9)
ERYTHROCYTE [DISTWIDTH] IN BLOOD BY AUTOMATED COUNT: 53.6 FL (ref 35.9–50)
GLOBULIN SER CALC-MCNC: 3.1 G/DL (ref 1.9–3.5)
GLUCOSE BLD-MCNC: 121 MG/DL (ref 65–99)
GLUCOSE BLD-MCNC: 77 MG/DL (ref 65–99)
GLUCOSE BLD-MCNC: 87 MG/DL (ref 65–99)
GLUCOSE BLD-MCNC: 95 MG/DL (ref 65–99)
GLUCOSE SERPL-MCNC: 76 MG/DL (ref 65–99)
HCT VFR BLD AUTO: 23.5 % (ref 37–47)
HGB BLD-MCNC: 7.3 G/DL (ref 12–16)
HYPOCHROMIA BLD QL SMEAR: ABNORMAL
LYMPHOCYTES # BLD AUTO: 1.23 K/UL (ref 1–4.8)
LYMPHOCYTES NFR BLD: 12.1 % (ref 22–41)
MACROCYTES BLD QL SMEAR: ABNORMAL
MAGNESIUM SERPL-MCNC: 2.1 MG/DL (ref 1.5–2.5)
MANUAL DIFF BLD: NORMAL
MCH RBC QN AUTO: 31.2 PG (ref 27–33)
MCHC RBC AUTO-ENTMCNC: 31.1 G/DL (ref 33.6–35)
MCV RBC AUTO: 100.4 FL (ref 81.4–97.8)
MONOCYTES # BLD AUTO: 0.27 K/UL (ref 0–0.85)
MONOCYTES NFR BLD AUTO: 2.6 % (ref 0–13.4)
MORPHOLOGY BLD-IMP: NORMAL
MYELOCYTES NFR BLD MANUAL: 1.7 %
NEUTROPHILS # BLD AUTO: 7.92 K/UL (ref 2–7.15)
NEUTROPHILS NFR BLD: 75.9 % (ref 44–72)
NEUTS BAND NFR BLD MANUAL: 1.7 % (ref 0–10)
NRBC # BLD AUTO: 0.07 K/UL
NRBC BLD-RTO: 0.7 /100 WBC
PHOSPHATE SERPL-MCNC: 3.8 MG/DL (ref 2.5–4.5)
PLATELET # BLD AUTO: 289 K/UL (ref 164–446)
PLATELET BLD QL SMEAR: NORMAL
PMV BLD AUTO: 9.9 FL (ref 9–12.9)
POIKILOCYTOSIS BLD QL SMEAR: NORMAL
POLYCHROMASIA BLD QL SMEAR: NORMAL
POTASSIUM SERPL-SCNC: 4.4 MMOL/L (ref 3.6–5.5)
PROT SERPL-MCNC: 5.6 G/DL (ref 6–8.2)
RBC # BLD AUTO: 2.34 M/UL (ref 4.2–5.4)
RBC BLD AUTO: PRESENT
SODIUM SERPL-SCNC: 131 MMOL/L (ref 135–145)
STOMATOCYTES BLD QL SMEAR: NORMAL
WBC # BLD AUTO: 10.2 K/UL (ref 4.8–10.8)

## 2020-10-20 PROCEDURE — 80053 COMPREHEN METABOLIC PANEL: CPT

## 2020-10-20 PROCEDURE — C9113 INJ PANTOPRAZOLE SODIUM, VIA: HCPCS | Performed by: HOSPITALIST

## 2020-10-20 PROCEDURE — 97166 OT EVAL MOD COMPLEX 45 MIN: CPT

## 2020-10-20 PROCEDURE — 700111 HCHG RX REV CODE 636 W/ 250 OVERRIDE (IP): Performed by: INTERNAL MEDICINE

## 2020-10-20 PROCEDURE — 700101 HCHG RX REV CODE 250: Performed by: HOSPITALIST

## 2020-10-20 PROCEDURE — 85027 COMPLETE CBC AUTOMATED: CPT

## 2020-10-20 PROCEDURE — 82962 GLUCOSE BLOOD TEST: CPT | Mod: 91

## 2020-10-20 PROCEDURE — 99233 SBSQ HOSP IP/OBS HIGH 50: CPT | Performed by: STUDENT IN AN ORGANIZED HEALTH CARE EDUCATION/TRAINING PROGRAM

## 2020-10-20 PROCEDURE — 97161 PT EVAL LOW COMPLEX 20 MIN: CPT

## 2020-10-20 PROCEDURE — 83735 ASSAY OF MAGNESIUM: CPT

## 2020-10-20 PROCEDURE — 84100 ASSAY OF PHOSPHORUS: CPT

## 2020-10-20 PROCEDURE — 700102 HCHG RX REV CODE 250 W/ 637 OVERRIDE(OP): Performed by: HOSPITALIST

## 2020-10-20 PROCEDURE — 700111 HCHG RX REV CODE 636 W/ 250 OVERRIDE (IP): Performed by: HOSPITALIST

## 2020-10-20 PROCEDURE — 85007 BL SMEAR W/DIFF WBC COUNT: CPT

## 2020-10-20 PROCEDURE — A9270 NON-COVERED ITEM OR SERVICE: HCPCS | Performed by: HOSPITALIST

## 2020-10-20 PROCEDURE — 770001 HCHG ROOM/CARE - MED/SURG/GYN PRIV*

## 2020-10-20 RX ORDER — METOCLOPRAMIDE HYDROCHLORIDE 5 MG/ML
10 INJECTION INTRAMUSCULAR; INTRAVENOUS EVERY 6 HOURS
Status: COMPLETED | OUTPATIENT
Start: 2020-10-20 | End: 2020-10-21

## 2020-10-20 RX ADMIN — PANTOPRAZOLE SODIUM 40 MG: 40 INJECTION, POWDER, LYOPHILIZED, FOR SOLUTION INTRAVENOUS at 17:44

## 2020-10-20 RX ADMIN — PANTOPRAZOLE SODIUM 40 MG: 40 INJECTION, POWDER, LYOPHILIZED, FOR SOLUTION INTRAVENOUS at 05:49

## 2020-10-20 RX ADMIN — SODIUM CHLORIDE, PRESERVATIVE FREE 10 ML: 5 INJECTION INTRAVENOUS at 17:45

## 2020-10-20 RX ADMIN — METOCLOPRAMIDE 10 MG: 5 INJECTION, SOLUTION INTRAMUSCULAR; INTRAVENOUS at 17:44

## 2020-10-20 RX ADMIN — NYSTATIN: 100000 POWDER TOPICAL at 17:50

## 2020-10-20 RX ADMIN — METOCLOPRAMIDE 10 MG: 5 INJECTION, SOLUTION INTRAMUSCULAR; INTRAVENOUS at 13:09

## 2020-10-20 RX ADMIN — LEVOTHYROXINE SODIUM 100 MCG: 0.05 TABLET ORAL at 05:49

## 2020-10-20 RX ADMIN — SODIUM CHLORIDE, PRESERVATIVE FREE 10 ML: 5 INJECTION INTRAVENOUS at 05:50

## 2020-10-20 RX ADMIN — NYSTATIN: 100000 POWDER TOPICAL at 05:49

## 2020-10-20 RX ADMIN — GABAPENTIN 200 MG: 100 CAPSULE ORAL at 17:44

## 2020-10-20 ASSESSMENT — COGNITIVE AND FUNCTIONAL STATUS - GENERAL
CLIMB 3 TO 5 STEPS WITH RAILING: TOTAL
MOVING FROM LYING ON BACK TO SITTING ON SIDE OF FLAT BED: A LOT
SUGGESTED CMS G CODE MODIFIER MOBILITY: CL
DRESSING REGULAR LOWER BODY CLOTHING: TOTAL
STANDING UP FROM CHAIR USING ARMS: A LOT
DRESSING REGULAR UPPER BODY CLOTHING: A LOT
EATING MEALS: A LITTLE
PERSONAL GROOMING: A LITTLE
MOBILITY SCORE: 11
MOVING TO AND FROM BED TO CHAIR: A LOT
TURNING FROM BACK TO SIDE WHILE IN FLAT BAD: A LOT
WALKING IN HOSPITAL ROOM: A LOT
DAILY ACTIVITIY SCORE: 11
TOILETING: TOTAL
HELP NEEDED FOR BATHING: TOTAL
SUGGESTED CMS G CODE MODIFIER DAILY ACTIVITY: CL

## 2020-10-20 ASSESSMENT — ENCOUNTER SYMPTOMS
NAUSEA: 0
SHORTNESS OF BREATH: 0
DEPRESSION: 0
ABDOMINAL PAIN: 0
COUGH: 1
BLURRED VISION: 0
DIZZINESS: 0
DIARRHEA: 0
DOUBLE VISION: 0
CONSTIPATION: 1
PALPITATIONS: 0
VOMITING: 0
COUGH: 0
FEVER: 0
CHILLS: 0
NECK PAIN: 0
MYALGIAS: 0
BLOOD IN STOOL: 0
HEADACHES: 0

## 2020-10-20 ASSESSMENT — GAIT ASSESSMENTS: GAIT LEVEL OF ASSIST: UNABLE TO PARTICIPATE

## 2020-10-20 ASSESSMENT — PAIN DESCRIPTION - PAIN TYPE
TYPE: ACUTE PAIN
TYPE: ACUTE PAIN

## 2020-10-20 ASSESSMENT — ACTIVITIES OF DAILY LIVING (ADL): TOILETING: INDEPENDENT

## 2020-10-20 NOTE — PROGRESS NOTES
Received report from ANGELICA Weiss. Assumed care at 1915. This pt is AOx4, ambulatory with max assistance, denies N/V this evening, (-) flatus, pt incontinent, purwick in place, last BM: 10/19/20, reporting no pain that is 0/10 at this time. Patient and RN discussed plan of care, all questions answered. Labs noted, assessment complete, patient tolerating clear liquid diet. Call light in place, personal belongings available, bed in lowest position, upper bedrails up, bed alarm on, patient educated on importance of calling for assistance, hourly rounding in place. No additional needs at this time. VSS. Pt at this time refusing 2 Rn skin check, educated patient on the importance of it, patient still refusing at this time. Pt agreed to doing one in the AM, will try again during during morning med pass. This RN did get a look at her sacrum and it was red, but blanching. Barrier wipes used when cleaning patient.

## 2020-10-20 NOTE — DISCHARGE PLANNING
Anticipated Discharge Disposition: SNF    Action: This case was discussed today during IDT Rounds. MD informed LSW, this patient will be assessed by PTOT for possible placement.     [facility] RN later informed LSW, this patient might need surgery before determining the discharge disposition.    Barriers to Discharge: Medical Clearance.    Plan: As Above. Awaiting recommendations from the medical team.

## 2020-10-20 NOTE — THERAPY
"Occupational Therapy   Initial Evaluation     Patient Name: Linda Haley  Age:  85 y.o., Sex:  female  Medical Record #: [id number]  Today's Date: 10/20/2020     Precautions  Precautions: Fall Risk, Swallow Precautions ( See Comments)  Comments: full liquid diet with reflux precautions    Assessment  Patient is 85 y.o. female with a diagnosis of hiatal hernia, GI bleed, s/p gastroscopy.  Additional factors influencing patient status / progress: weakness, fatigue, impaired balance, impaired cognition.      Plan    Recommend Occupational Therapy 2 times per week until therapy goals are met for the following treatments:  Adaptive Equipment, Neuro Re-Education / Balance, Self Care/Activities of Daily Living, Therapeutic Activities and Therapeutic Exercises.    DC Equipment Recommendations: Unable to determine at this time  Discharge Recommendations: Recommend post-acute placement for additional occupational therapy services prior to discharge home     Subjective    \"Why do you always come when my show is on?\"        10/20/20 1040   Prior Living Situation   Prior Services Intermittent Physical Support for ADL Per Service   Housing / Facility Skilled Nursing Facility   Bathroom Set up Walk In Shower;Grab Bars;Shower Chair   Equipment Owned 4-Wheel Walker;Tub / Shower Seat;Grab Bar(s) In Tub / Shower;Grab Bar(s) By Toilet   Lives with - Patient's Self Care Capacity Other (Comments)   Comments Prior to hospitalization and SNF stay, living in [facility]    Prior Level of ADL Function   Self Feeding Independent   Grooming / Hygiene Independent   Bathing Requires Assist   Dressing Independent   Toileting Independent   Prior Level of IADL Function   Comments Really likes the Price is Right from 10-11AM   Cognition    Cognition / Consciousness X   Level of Consciousness Alert   Safety Awareness Impaired   New Learning Impaired   Comments Very upset about having her show interrupted, attempted to turn the bed for her to watch her show " while working on sitting EOB balance   Strength Upper Body   Upper Body Strength  X   Gross Strength Generalized Weakness, Equal Bilaterally.    Balance Assessment   Sitting Balance (Static) Poor +   Sitting Balance (Dynamic) Poor   Weight Shift Sitting Poor   Comments refused to stand   Bed Mobility    Supine to Sit Total Assist   Sit to Supine Total Assist   Scooting Maximal Assist   ADL Assessment   Grooming Supervision;Seated  (setup in bed, refused to work on it EOB)   Lower Body Dressing Total Assist   Functional Mobility   Sit to Stand Refused   Bed, Chair, Wheelchair Transfer Refused   Mobility EOB only   Patient / Family Goals   Patient / Family Goal #1 To watch her show   Short Term Goals   Short Term Goal # 1 Min A commode transfer   Short Term Goal # 2 Min A toileting   Short Term Goal # 3 Min A lower body dressing with adaptive equipment as needed

## 2020-10-20 NOTE — THERAPY
Physical Therapy   Initial Evaluation     Patient Name: Linda Haley  Age:  85 y.o., Sex:  female  Medical Record #: [id number]  Today's Date: 10/20/2020     Precautions: Fall Risk, Swallow Precautions ( See Comments)    Assessment  Patient is 85 y.o. female with a diagnosis of hiatal hernia, GI bleed, r/o gastric obstruction.  Additional factors influencing patient status / progress: today, pt needed total assist to come to EOB, refused to attempt standing as she was very focused on her television show. PT to follow.      Plan    Recommend Physical Therapy 2 times per week until therapy goals are met for the following treatments:  Bed Mobility, Gait Training, Neuro Re-Education / Balance and Therapeutic Activities    DC Equipment Recommendations: Unable to determine at this time  Discharge Recommendations: Recommend post-acute placement for additional physical therapy services prior to discharge home            Objective       10/20/20 1039   Prior Living Situation   Prior Services Intermittent Physical Support for ADL Per Service   Housing / Facility Skilled Nursing Facility   Equipment Owned 4-Wheel Walker   Lives with - Patient's Self Care Capacity Other (Comments)   Comments lived in [facility] previously, then hospital and SNF   Prior Level of Functional Mobility   Bed Mobility Required Assist   Transfer Status Required Assist   Ambulation Required Assist   Distance Ambulation (Feet)   (pt did not answer)   Assistive Devices Used 4-Wheel Walker   Comments pt irritable, very focused on watching tv show.   Cognition    Level of Consciousness Alert   Safety Awareness Impaired   New Learning Impaired   Balance Assessment   Comments refused to attempt standing.    Gait Analysis   Gait Level Of Assist Unable to Participate   Bed Mobility    Supine to Sit Total Assist   Sit to Supine Total Assist   Scooting Maximal Assist   Functional Mobility   Sit to Stand Refused   Bed, Chair, Wheelchair Transfer Refused   Short Term  Goals    Short Term Goal # 1 Pt will perform bed mobility with min a in 6 visits   Short Term Goal # 2 Pt will transfer bed to chair with min a in 6 visits to improve functional indep.    Short Term Goal # 3 Pt will ambulate x 25 feet using FWW with min a in 6 visits.    Anticipated Discharge Equipment and Recommendations   DC Equipment Recommendations Unable to determine at this time   Discharge Recommendations Recommend post-acute placement for additional physical therapy services prior to discharge home

## 2020-10-20 NOTE — PROGRESS NOTES
"Hospital Medicine Daily Progress Note    Date of Service  10/20/2020    Chief Complaint  85 y.o. female admitted 10/15/2020 with vomiting and possible gastric outlet obstruction    Hospital Course    \"50-year-old female with a past medical history of hypothyroidism, diabetes mellitus, GI bleed, gastritis, normocytic anemia, respiratory failure, aspiration pneumonia, and hiatal hernia was transferred from [facility] for potential gastric outlet obstruction secondary to hiatal hernia requiring total parenteral nutrition.  Pull through without any signs of obstruction.  Patient was started on liquid diet and TPN was tapered.  She underwent EGD on 10/19/2020 for which she was noted for  large amount of retained coffee-ground like debris and solid vegetable Matter, Abnormal fundic mucosa with question of mass (Bx taken) and Abnormal fundic mucosa with question of mass.\"        Interval Problem Update  No acute events overnight.  Denies dyspnea.  Continue clear liquid diet per GI.  GI recommending metoclopramide 10 mg x 3 doses today, 10/20, to assist with passage of retained old fluid/blood.  They will repeat EGD tomorrow, 10/21.    Consultants/Specialty  General Surgery - Dr. Reddy.   Gastroenterology - Dr. Egan.      Code Status  DNAR/DNI    Disposition  Inpatient    Review of Systems  Review of Systems   Constitutional: Positive for malaise/fatigue. Negative for chills and fever.   HENT: Negative for hearing loss and tinnitus.    Eyes: Negative for blurred vision and double vision.   Respiratory: Negative for cough and shortness of breath.    Cardiovascular: Negative for chest pain and palpitations.   Gastrointestinal: Positive for constipation. Negative for vomiting.   Genitourinary: Negative for dysuria and urgency.   Musculoskeletal: Negative for myalgias and neck pain.   Skin: Negative for itching and rash.   Neurological: Negative for dizziness and headaches.   Psychiatric/Behavioral: Negative for " depression and suicidal ideas.        Physical Exam  Temp:  [36.3 °C (97.3 °F)-37 °C (98.6 °F)] 36.6 °C (97.8 °F)  Pulse:  [78-93] 79  Resp:  [16-18] 18  BP: (128-136)/(64-77) 128/77  SpO2:  [96 %-99 %] 99 %    Physical Exam  Constitutional:       General: She is not in acute distress.     Appearance: Normal appearance.   HENT:      Head: Normocephalic and atraumatic.   Eyes:      Extraocular Movements: Extraocular movements intact.      Pupils: Pupils are equal, round, and reactive to light.   Neck:      Musculoskeletal: Normal range of motion and neck supple.   Cardiovascular:      Rate and Rhythm: Normal rate and regular rhythm.      Pulses: Normal pulses.      Heart sounds: Normal heart sounds.   Pulmonary:      Effort: Pulmonary effort is normal.      Breath sounds: No rales.   Abdominal:      General: Bowel sounds are normal. There is distension (stable).      Palpations: Abdomen is soft.   Musculoskeletal: Normal range of motion.         General: No tenderness.      Comments: mild b/l LE edema, stable   Skin:     General: Skin is dry.      Coloration: Skin is not jaundiced.   Neurological:      General: No focal deficit present.      Mental Status: She is alert. Mental status is at baseline.   Psychiatric:         Mood and Affect: Mood normal.         Fluids    Intake/Output Summary (Last 24 hours) at 10/20/2020 1318  Last data filed at 10/20/2020 1146  Gross per 24 hour   Intake 690 ml   Output 1150 ml   Net -460 ml       Laboratory  Recent Labs     10/18/20  0500 10/18/20  1350 10/19/20  0415 10/20/20  0200   WBC 9.2  --  10.2 10.2   RBC 2.37*  --  2.40* 2.34*   HEMOGLOBIN 7.6* 7.3* 7.5* 7.3*   HEMATOCRIT 24.0* 23.0* 23.9* 23.5*   .0*  --  99.6* 100.4*   MCH 31.6  --  31.3 31.2   MCHC 31.6*  --  31.4* 31.1*   RDW 52.5*  --  53.6* 53.6*   PLATELETCT 252  --  276 289   MPV 10.4  --  10.3 9.9     Recent Labs     10/18/20  0500 10/19/20  0415 10/20/20  0200   SODIUM 134* 132* 131*   POTASSIUM 4.2 4.4 4.4    CHLORIDE 94* 94* 94*   CO2 36* 31 32   GLUCOSE 116* 87 76   BUN 34* 28* 24*   CREATININE 0.54 0.58 0.53   CALCIUM 9.5 9.2 9.6                   Imaging  IR-PICC LINE PLACEMENT W/ GUIDANCE > AGE 5   Final Result                  Ultrasound-guided PICC placement performed by qualified nursing staff as    above.          DX-UPPER GI-SMALL BOWEL FOLLOW THRU   Final Result      1.  Negative small bowel follow-through. No obstruction      2.  Moderate thoracic esophageal dysmotility and the esophagus is mildly dilated.      DX-CHEST-FOR LINE PLACEMENT Perform procedure in: Patient's Room   Final Result      Left subclavian central line identified with tip in expected location of the superior vena cava.      Left pleural fluid and left basilar consolidation.      OUTSIDE IMAGES-CT ABDOMEN /PELVIS   Final Result      OUTSIDE IMAGES-CT ABDOMEN /PELVIS   Final Result      OUTSIDE IMAGES-DX ABDOMEN   Final Result      LA-[id number] FILM X-RAY   Final Result      OUTSIDE IMAGES-US VASCULAR   Final Result      OUTSIDE IMAGES-DX ABDOMEN   Final Result           Assessment/Plan  * Gastric outflow obstruction- (present on admission)  Assessment & Plan  -  GI onboard; appreciated.  10/19 EGD showed large amount of retained coffee-ground like debris and solid vegetable matter, fundic mucosa with question of mass (bx pending).  Planning on repeat EGD tomorrow, 10/21, for better visualization of ulcer and additional biopsies.   - Follow up biopsies.   - Per GI, continue clear liquid diet.  GI recommending metoclopramide 10 mg x 3 doses today, 10/20, to assist with passage of retained old fluid/blood.      Upper GI bleed- (present on admission)  Assessment & Plan  - With anemia.  Hgb stable.  Transfuse 1 unit packed RBCs if Hgb < 7 or patient symptomatic.  - GI onboard; appreciated.  Planning on repeat EGD tomorrow, 10/21, for better visualization of ulcer and additional biopsies.  NPO at midnight.   - Continue IV pantoprazole 40  mg twice daily.  - Monitor CBC.     Type 2 diabetes mellitus without complication, with long-term current use of insulin (HCC)  Assessment & Plan  - Continue correctional insulin.  - Hypoglycemia protocol.     Gastric mass- (present on admission)  Assessment & Plan  - 10/19 EGD notes possible gastric mass.   - GI onboard.  Repeat EGD 10/21 for further biopsies.   - Follow up bx.     Hypothyroidism  Assessment & Plan  - Continue home levothyroxine.     Recurrent left pleural effusion  Assessment & Plan  - Noted on outside chest CT and on 10/15 CXR.   - Completed IV antibiotic for aspiration pneumonia at outside facility.   - 10/2020 ECHO w/EF 70%, Pro-BNP < 400.   - Patient saturating well on 1 L nasal cannula.   - Likely secondary to atelectasis from large hiatal hernia.   - Continue IS.    Hypomagnesemia  Assessment & Plan  - Replete PRN.     Aspiration pneumonia (HCC)- (present on admission)  Assessment & Plan  - Completed antibiotic regimen at outside facility.  Further abx has been stopped due to possible gastric outlet obstruction.   - No signs of active infection.  Monitor.    - SLP onboard; appreciated.    Hiatal hernia- (present on admission)  Assessment & Plan  - Patient evaluated by Surgery at outside facility, who recommended transfer to [facility] for Surgery/GI evaluation.   - General Surgery onboard; appreciated.       VTE prophylaxis: Chemical prophylaxis contraindicated due to upper GI bleed.  SCDs ordered.    Ellie Leung M.D.

## 2020-10-20 NOTE — PROGRESS NOTES
Patient was residing at [facility], in [place], [place], prior to admit.   [phone]

## 2020-10-20 NOTE — PROGRESS NOTES
Gastroenterology Progress Note     Author: Priscilla Egan M.D.   Date & Time Created: 10/20/2020 11:55 AM    Chief Complaint:  Vomiting    History of Presenting Illness  85 y.o. female with HTN, hyperlipidemia, DM who was transferred from OSH 10/15/2020 for vomiting and concern for gastric outlet obstruction and small bowel obstruction.     Most of history obtained from review of chart and she is poor historian overall.     She apparently initially presented with nausea and vomiting.  Initially felt to have possible small bowel obstruction treated conservatively.  Still had persistent vomiting, unclear if related to oral intake or even occurred without eating.  Additional evaluation with EGD showed retained fluid and food within esophagus and hiatal hernia (almost 600 mL) per report as well as possible slight narrowing of pylorus.  Gastric biopsies negative for H pylori.  Difficult to maneuver scope through hiatal hernia.  UGI series showed slow emptying of hiatal hernia sac but no SBFT performed.  She has been on TPN  But is also taking clear liquids per report.  She denies issues currently.  She is having BMs per her.  Denies abdominal pain.     She has been seen by surgery and is currently undergoing UGI SBFT. She did have EGD 2015 showing 5 cm HH with [place] erosions and MW tear.    Interval History:  10/17/2020: UGI SBFT yesterday shows esophageal dysmotility but no obvious hiatal hernia or gastric outlet obstruction and normal small bowel transit.  She wants to eat and drink.  She is difficult historian.  When asked if she has had ongoing dysphagia, vomiting, heartburn, regurgitation at home prior to hospital stay, she denies but unclear.  I reviewed imaging with Dr. Chambers radiology today.  He feels that UGI SBFT does show large hiatal hernia with some delayed exit of contrast likely contributing to possible reflux or delayed clearance from esophagus but limited images.  Prior CT with large hiatal  hernia as well.    10/18/2020:  Surgery not planning any surgery currently.  Tolerating full liquid diet.  No BM for 3 days.  Hgb dropped from 10 to 7 but no overt bleeding seen.  10/19/2020:  Large amount of coffee grounds and food in stomach on EGD limiting visualization.  Appeared to have large fundic ulcer.  10/20/2020:  No specific complaints    Review of Systems:  Review of Systems   Constitutional: Positive for malaise/fatigue. Negative for chills and fever.   Respiratory: Positive for cough.    Gastrointestinal: Negative for abdominal pain, blood in stool, diarrhea, melena, nausea and vomiting.   All other systems reviewed and are negative.      Physical Exam:  Physical Exam  Vitals signs and nursing note reviewed.   Constitutional:       General: She is not in acute distress.     Appearance: She is not ill-appearing or toxic-appearing.      Comments: Elderly appearing   Cardiovascular:      Rate and Rhythm: Normal rate and regular rhythm.   Pulmonary:      Effort: Pulmonary effort is normal. No respiratory distress.      Breath sounds: No stridor. No wheezing or rales.   Abdominal:      General: Abdomen is flat. Bowel sounds are normal. There is no distension.      Palpations: Abdomen is soft. There is no mass.      Tenderness: There is no abdominal tenderness. There is no guarding or rebound.      Hernia: No hernia is present.   Neurological:      General: No focal deficit present.      Mental Status: She is alert and oriented to person, place, and time.         Labs:          Recent Labs     10/18/20  0500 10/19/20  0415 10/20/20  0200   SODIUM 134* 132* 131*   POTASSIUM 4.2 4.4 4.4   CHLORIDE 94* 94* 94*   CO2 36* 31 32   BUN 34* 28* 24*   CREATININE 0.54 0.58 0.53   MAGNESIUM 2.5 2.2 2.1   PHOSPHORUS 2.7 3.1 3.8   CALCIUM 9.5 9.2 9.6     Recent Labs     10/18/20  0500 10/19/20  0415 10/20/20  0200   ALTSGPT  --  31 29   ASTSGOT  --  24 22   ALKPHOSPHAT  --  150* 143*   TBILIRUBIN  --  0.2 0.2   GLUCOSE  116* 87 76     Recent Labs     10/18/20  0500 10/18/20  1350 10/19/20  0415 10/20/20  0200   RBC 2.37*  --  2.40* 2.34*   HEMOGLOBIN 7.6* 7.3* 7.5* 7.3*   HEMATOCRIT 24.0* 23.0* 23.9* 23.5*   PLATELETCT 252  --  276 289     Recent Labs     10/18/20  0500 10/19/20  0415 10/20/20  0200   WBC 9.2 10.2 10.2   NEUTSPOLYS 66.70 68.70 75.90*   LYMPHOCYTES 16.00* 18.30* 12.10*   MONOCYTES 11.40 5.20 2.60   EOSINOPHILS 2.00 6.10 6.00   BASOPHILS 0.90 0.00 0.00   ASTSGOT  --  24 22   ALTSGPT  --  31 29   ALKPHOSPHAT  --  150* 143*   TBILIRUBIN  --  0.2 0.2     Hemodynamics:  Temp (24hrs), Av.7 °C (98 °F), Min:36.3 °C (97.3 °F), Max:37 °C (98.6 °F)  Temperature: 36.6 °C (97.8 °F)  Pulse  Av  Min: 57  Max: 118   Blood Pressure : 128/77     Respiratory:    Respiration: 18, Pulse Oximetry: 99 %     Work Of Breathing / Effort: Mild  RUL Breath Sounds: Clear, RML Breath Sounds: Diminished, RLL Breath Sounds: Diminished, DAVIDSON Breath Sounds: Clear, LLL Breath Sounds: Diminished  Fluids:    Intake/Output Summary (Last 24 hours) at 10/17/2020 1127  Last data filed at 10/17/2020 0343  Gross per 24 hour   Intake 0 ml   Output 2200 ml   Net -2200 ml        GI/Nutrition:  Orders Placed This Encounter   Procedures   • Diet Order Clear Liquid     Standing Status:   Standing     Number of Occurrences:   1     Order Specific Question:   Diet:     Answer:   Clear Liquid [10]     Medical Decision Making, by Problem:  Active Hospital Problems    Diagnosis   • *Gastric outflow obstruction [K31.1]   • Anemia [D64.9]   • GI bleed [K92.2]   • Hypomagnesemia [E83.42]   • Recurrent left pleural effusion [J90]   • Hiatal hernia [K44.9]   • Aspiration pneumonia (HCC) [J69.0]     PROBLEMS:  1.  Fundic ulcer, no malignant cells on path but not adequate tissue due to amount of debris on the ulcer  2.  Anemia, acute blood loss  3. Nausea and vomiting. Question dysmotility  4. Abnormal UGI series.  Initial UGI SBFT here read as no hiatal hernia but  after review with radiology today, he does feel there is large hiatal hernia with delayed clearance of contrast from hernia contributing to delayed emptying from esophagus.  However, contrast does flow through hernia and empties out of stomach and traverses small bowel normally    5. Hiatal hernia  6. Possible recent small bowel obstruction  7. Hypertension  8. Hyperlipidemia  9. Diabetes mellitus  10. Protein-calorie malnutrition, moderate     Plan:  1. Cl liq diet to minimize retained food  2. Metoclopramide 10 mg x 3 doses today to assist with passage of retained old blood and food  3. Repeat EGD to better survey ulcer and additional biopsies tomorrow.  Will also try to better assess hiatal hernia and if Rodri's lesions  4. Continue IV BID pantoprazole    Quality-Core Measures   Reviewed items::  Medications reviewed, Labs reviewed and Radiology images reviewed

## 2020-10-20 NOTE — CARE PLAN
Problem: Communication  Goal: The ability to communicate needs accurately and effectively will improve  Outcome: PROGRESSING AS EXPECTED  Note: Patient educated to call when in need of assistance. Call light within reach. All needs met at this time.      Problem: Safety  Goal: Will remain free from injury  Outcome: PROGRESSING AS EXPECTED  Note: Patient educated on fall risk and safety. Call light within reach. Bed alarm on. Bed is locked and in lowest position.   Goal: Will remain free from falls  Outcome: PROGRESSING AS EXPECTED

## 2020-10-20 NOTE — PROGRESS NOTES
Progress Note               Author: Luis Reddy M.D. Date & Time created: 10/20/2020  12:10 PM     Interval History:  Tolerating clears   No pain in abdomen   Immobile   Dependent ADL's   Path neg for malignancy  Gastric ulcer may explain syndrome   HH relatively small compared to surgical risk   Slow gastric emptying on UGI with GOO or SBO   Continue with protonix   ? Prokinetic therapy other than sitting up with I endorse   Will see what GI plan is   Follow      Review of Systems:  Review of Systems   Gastrointestinal: Negative for abdominal pain, nausea and vomiting.   All other systems reviewed and are negative.      Physical Exam:  Physical Exam  Vitals signs and nursing note reviewed. Exam conducted with a chaperone present.   HENT:      Head: Normocephalic.      Nose: Nose normal.   Eyes:      Pupils: Pupils are equal, round, and reactive to light.   Pulmonary:      Breath sounds: Normal breath sounds.   Abdominal:      General: Abdomen is flat. There is no distension.      Palpations: Abdomen is soft.   Musculoskeletal: Normal range of motion.   Neurological:      General: No focal deficit present.      Mental Status: She is alert.   Psychiatric:         Mood and Affect: Mood normal.         Labs:          Recent Labs     10/18/20  0500 10/19/20  0415 10/20/20  0200   SODIUM 134* 132* 131*   POTASSIUM 4.2 4.4 4.4   CHLORIDE 94* 94* 94*   CO2 36* 31 32   BUN 34* 28* 24*   CREATININE 0.54 0.58 0.53   MAGNESIUM 2.5 2.2 2.1   PHOSPHORUS 2.7 3.1 3.8   CALCIUM 9.5 9.2 9.6     Recent Labs     10/18/20  0500 10/19/20  0415 10/20/20  0200   ALTSGPT  --  31 29   ASTSGOT  --  24 22   ALKPHOSPHAT  --  150* 143*   TBILIRUBIN  --  0.2 0.2   GLUCOSE 116* 87 76     Recent Labs     10/18/20  0500 10/18/20  1350 10/19/20  0415 10/20/20  0200   RBC 2.37*  --  2.40* 2.34*   HEMOGLOBIN 7.6* 7.3* 7.5* 7.3*   HEMATOCRIT 24.0* 23.0* 23.9* 23.5*   PLATELETCT 252  --  276 289     Recent Labs     10/18/20  0500 10/19/20  0415  10/20/20  0200   WBC 9.2 10.2 10.2   NEUTSPOLYS 66.70 68.70 75.90*   LYMPHOCYTES 16.00* 18.30* 12.10*   MONOCYTES 11.40 5.20 2.60   EOSINOPHILS 2.00 6.10 6.00   BASOPHILS 0.90 0.00 0.00   ASTSGOT  --  24 22   ALTSGPT  --  31 29   ALKPHOSPHAT  --  150* 143*   TBILIRUBIN  --  0.2 0.2     Hemodynamics:  Temp (24hrs), Av.7 °C (98 °F), Min:36.3 °C (97.3 °F), Max:37 °C (98.6 °F)  Temperature: 36.6 °C (97.8 °F)  Pulse  Av  Min: 57  Max: 118   Blood Pressure : 128/77     Respiratory:    Respiration: 18, Pulse Oximetry: 99 %     Work Of Breathing / Effort: Mild  RUL Breath Sounds: Clear, RML Breath Sounds: Diminished, RLL Breath Sounds: Diminished, DAVIDSON Breath Sounds: Clear, LLL Breath Sounds: Diminished  Fluids:    Intake/Output Summary (Last 24 hours) at 10/20/2020 1210  Last data filed at 10/20/2020 1146  Gross per 24 hour   Intake 810 ml   Output 1150 ml   Net -340 ml        GI/Nutrition:  Orders Placed This Encounter   Procedures   • Diet Order Clear Liquid     Standing Status:   Standing     Number of Occurrences:   1     Order Specific Question:   Diet:     Answer:   Clear Liquid [10]     Medical Decision Making, by Problem:  Active Hospital Problems    Diagnosis   • *Gastric outflow obstruction [K31.1]   • Anemia [D64.9]   • GI bleed [K92.2]   • Gastric mass [K31.89]   • Hypomagnesemia [E83.42]   • Recurrent left pleural effusion [J90]   • Hiatal hernia [K44.9]   • Aspiration pneumonia (HCC) [J69.0]       Plan:  As above     Quality-Core Measures   Noted   30 minutes , high complexity / risk for deterioration

## 2020-10-21 ENCOUNTER — ANESTHESIA EVENT (OUTPATIENT)
Dept: SURGERY | Facility: MEDICAL CENTER | Age: 85
DRG: 380 | End: 2020-10-21
Payer: MEDICARE

## 2020-10-21 ENCOUNTER — ANESTHESIA (OUTPATIENT)
Dept: SURGERY | Facility: MEDICAL CENTER | Age: 85
DRG: 380 | End: 2020-10-21
Payer: MEDICARE

## 2020-10-21 LAB
ANION GAP SERPL CALC-SCNC: 5 MMOL/L (ref 7–16)
BUN SERPL-MCNC: 17 MG/DL (ref 8–22)
CALCIUM SERPL-MCNC: 9.4 MG/DL (ref 8.5–10.5)
CHLORIDE SERPL-SCNC: 94 MMOL/L (ref 96–112)
CO2 SERPL-SCNC: 33 MMOL/L (ref 20–33)
CREAT SERPL-MCNC: 0.51 MG/DL (ref 0.5–1.4)
GLUCOSE BLD-MCNC: 77 MG/DL (ref 65–99)
GLUCOSE BLD-MCNC: 78 MG/DL (ref 65–99)
GLUCOSE SERPL-MCNC: 82 MG/DL (ref 65–99)
HCT VFR BLD AUTO: 24.7 % (ref 37–47)
HGB BLD-MCNC: 7.8 G/DL (ref 12–16)
PATHOLOGY CONSULT NOTE: NORMAL
POTASSIUM SERPL-SCNC: 4.1 MMOL/L (ref 3.6–5.5)
SODIUM SERPL-SCNC: 132 MMOL/L (ref 135–145)

## 2020-10-21 PROCEDURE — 160202 HCHG ENDO MINUTES - 1ST 30 MINS LEVEL 3: Performed by: INTERNAL MEDICINE

## 2020-10-21 PROCEDURE — 85018 HEMOGLOBIN: CPT

## 2020-10-21 PROCEDURE — 700102 HCHG RX REV CODE 250 W/ 637 OVERRIDE(OP): Performed by: HOSPITALIST

## 2020-10-21 PROCEDURE — 502240 HCHG MISC OR SUPPLY RC 0272: Performed by: INTERNAL MEDICINE

## 2020-10-21 PROCEDURE — 80048 BASIC METABOLIC PNL TOTAL CA: CPT

## 2020-10-21 PROCEDURE — A9270 NON-COVERED ITEM OR SERVICE: HCPCS | Performed by: INTERNAL MEDICINE

## 2020-10-21 PROCEDURE — 160036 HCHG PACU - EA ADDL 30 MINS PHASE I: Performed by: INTERNAL MEDICINE

## 2020-10-21 PROCEDURE — 700101 HCHG RX REV CODE 250: Performed by: HOSPITALIST

## 2020-10-21 PROCEDURE — 700101 HCHG RX REV CODE 250: Performed by: ANESTHESIOLOGY

## 2020-10-21 PROCEDURE — 0DB68ZX EXCISION OF STOMACH, VIA NATURAL OR ARTIFICIAL OPENING ENDOSCOPIC, DIAGNOSTIC: ICD-10-PCS | Performed by: INTERNAL MEDICINE

## 2020-10-21 PROCEDURE — 160207 HCHG ENDO MINUTES - EA ADDL 1 MIN LEVEL 3: Performed by: INTERNAL MEDICINE

## 2020-10-21 PROCEDURE — 99232 SBSQ HOSP IP/OBS MODERATE 35: CPT | Performed by: STUDENT IN AN ORGANIZED HEALTH CARE EDUCATION/TRAINING PROGRAM

## 2020-10-21 PROCEDURE — 82962 GLUCOSE BLOOD TEST: CPT

## 2020-10-21 PROCEDURE — 770001 HCHG ROOM/CARE - MED/SURG/GYN PRIV*

## 2020-10-21 PROCEDURE — 700102 HCHG RX REV CODE 250 W/ 637 OVERRIDE(OP): Performed by: INTERNAL MEDICINE

## 2020-10-21 PROCEDURE — 88312 SPECIAL STAINS GROUP 1: CPT

## 2020-10-21 PROCEDURE — 700111 HCHG RX REV CODE 636 W/ 250 OVERRIDE (IP): Performed by: ANESTHESIOLOGY

## 2020-10-21 PROCEDURE — 160009 HCHG ANES TIME/MIN: Performed by: INTERNAL MEDICINE

## 2020-10-21 PROCEDURE — 160002 HCHG RECOVERY MINUTES (STAT): Performed by: INTERNAL MEDICINE

## 2020-10-21 PROCEDURE — C9113 INJ PANTOPRAZOLE SODIUM, VIA: HCPCS | Performed by: HOSPITALIST

## 2020-10-21 PROCEDURE — 700105 HCHG RX REV CODE 258: Performed by: ANESTHESIOLOGY

## 2020-10-21 PROCEDURE — 88305 TISSUE EXAM BY PATHOLOGIST: CPT

## 2020-10-21 PROCEDURE — 88342 IMHCHEM/IMCYTCHM 1ST ANTB: CPT

## 2020-10-21 PROCEDURE — 160048 HCHG OR STATISTICAL LEVEL 1-5: Performed by: INTERNAL MEDICINE

## 2020-10-21 PROCEDURE — 160035 HCHG PACU - 1ST 60 MINS PHASE I: Performed by: INTERNAL MEDICINE

## 2020-10-21 PROCEDURE — 700111 HCHG RX REV CODE 636 W/ 250 OVERRIDE (IP): Performed by: HOSPITALIST

## 2020-10-21 PROCEDURE — 500066 HCHG BITE BLOCK, ECT: Performed by: INTERNAL MEDICINE

## 2020-10-21 PROCEDURE — 700111 HCHG RX REV CODE 636 W/ 250 OVERRIDE (IP): Performed by: INTERNAL MEDICINE

## 2020-10-21 PROCEDURE — 85014 HEMATOCRIT: CPT

## 2020-10-21 PROCEDURE — A9270 NON-COVERED ITEM OR SERVICE: HCPCS | Performed by: HOSPITALIST

## 2020-10-21 RX ORDER — DIPHENHYDRAMINE HYDROCHLORIDE 50 MG/ML
12.5 INJECTION INTRAMUSCULAR; INTRAVENOUS
Status: DISCONTINUED | OUTPATIENT
Start: 2020-10-21 | End: 2020-10-21 | Stop reason: HOSPADM

## 2020-10-21 RX ORDER — SODIUM CHLORIDE, SODIUM LACTATE, POTASSIUM CHLORIDE, CALCIUM CHLORIDE 600; 310; 30; 20 MG/100ML; MG/100ML; MG/100ML; MG/100ML
INJECTION, SOLUTION INTRAVENOUS CONTINUOUS
Status: DISCONTINUED | OUTPATIENT
Start: 2020-10-21 | End: 2020-10-21 | Stop reason: HOSPADM

## 2020-10-21 RX ORDER — ROCURONIUM BROMIDE 10 MG/ML
INJECTION, SOLUTION INTRAVENOUS PRN
Status: DISCONTINUED | OUTPATIENT
Start: 2020-10-21 | End: 2020-10-21 | Stop reason: SURG

## 2020-10-21 RX ORDER — SODIUM CHLORIDE, SODIUM LACTATE, POTASSIUM CHLORIDE, CALCIUM CHLORIDE 600; 310; 30; 20 MG/100ML; MG/100ML; MG/100ML; MG/100ML
INJECTION, SOLUTION INTRAVENOUS
Status: DISCONTINUED | OUTPATIENT
Start: 2020-10-21 | End: 2020-10-21 | Stop reason: SURG

## 2020-10-21 RX ORDER — HALOPERIDOL 5 MG/ML
1 INJECTION INTRAMUSCULAR
Status: DISCONTINUED | OUTPATIENT
Start: 2020-10-21 | End: 2020-10-21 | Stop reason: HOSPADM

## 2020-10-21 RX ORDER — SUCRALFATE ORAL 1 G/10ML
1 SUSPENSION ORAL EVERY 6 HOURS
Status: DISCONTINUED | OUTPATIENT
Start: 2020-10-21 | End: 2020-10-23 | Stop reason: HOSPADM

## 2020-10-21 RX ORDER — SUCCINYLCHOLINE/SOD CL,ISO/PF 200MG/10ML
SYRINGE (ML) INTRAVENOUS PRN
Status: DISCONTINUED | OUTPATIENT
Start: 2020-10-21 | End: 2020-10-21 | Stop reason: SURG

## 2020-10-21 RX ORDER — ONDANSETRON 2 MG/ML
4 INJECTION INTRAMUSCULAR; INTRAVENOUS
Status: DISCONTINUED | OUTPATIENT
Start: 2020-10-21 | End: 2020-10-21 | Stop reason: HOSPADM

## 2020-10-21 RX ORDER — LIDOCAINE HYDROCHLORIDE 20 MG/ML
INJECTION, SOLUTION EPIDURAL; INFILTRATION; INTRACAUDAL; PERINEURAL PRN
Status: DISCONTINUED | OUTPATIENT
Start: 2020-10-21 | End: 2020-10-21 | Stop reason: SURG

## 2020-10-21 RX ADMIN — LEVOTHYROXINE SODIUM 100 MCG: 0.05 TABLET ORAL at 06:06

## 2020-10-21 RX ADMIN — LIDOCAINE HYDROCHLORIDE 60 MG: 20 INJECTION, SOLUTION EPIDURAL; INFILTRATION; INTRACAUDAL at 11:44

## 2020-10-21 RX ADMIN — SODIUM CHLORIDE, POTASSIUM CHLORIDE, SODIUM LACTATE AND CALCIUM CHLORIDE: 600; 310; 30; 20 INJECTION, SOLUTION INTRAVENOUS at 11:40

## 2020-10-21 RX ADMIN — NYSTATIN: 100000 POWDER TOPICAL at 17:05

## 2020-10-21 RX ADMIN — SUCRALFATE 1 G: 1 SUSPENSION ORAL at 17:06

## 2020-10-21 RX ADMIN — ROCURONIUM BROMIDE 10 MG: 10 INJECTION, SOLUTION INTRAVENOUS at 11:44

## 2020-10-21 RX ADMIN — NYSTATIN: 100000 POWDER TOPICAL at 06:06

## 2020-10-21 RX ADMIN — SODIUM CHLORIDE, PRESERVATIVE FREE 10 ML: 5 INJECTION INTRAVENOUS at 17:06

## 2020-10-21 RX ADMIN — PANTOPRAZOLE SODIUM 40 MG: 40 INJECTION, POWDER, LYOPHILIZED, FOR SOLUTION INTRAVENOUS at 17:06

## 2020-10-21 RX ADMIN — Medication 100 MG: at 11:44

## 2020-10-21 RX ADMIN — GABAPENTIN 200 MG: 100 CAPSULE ORAL at 17:06

## 2020-10-21 RX ADMIN — PROPOFOL 50 MG: 10 INJECTION, EMULSION INTRAVENOUS at 11:44

## 2020-10-21 RX ADMIN — METOCLOPRAMIDE 10 MG: 5 INJECTION, SOLUTION INTRAMUSCULAR; INTRAVENOUS at 01:08

## 2020-10-21 RX ADMIN — PANTOPRAZOLE SODIUM 40 MG: 40 INJECTION, POWDER, LYOPHILIZED, FOR SOLUTION INTRAVENOUS at 06:06

## 2020-10-21 RX ADMIN — FENTANYL CITRATE 50 MCG: 50 INJECTION, SOLUTION INTRAMUSCULAR; INTRAVENOUS at 11:44

## 2020-10-21 RX ADMIN — SODIUM CHLORIDE, PRESERVATIVE FREE 10 ML: 5 INJECTION INTRAVENOUS at 06:06

## 2020-10-21 ASSESSMENT — ENCOUNTER SYMPTOMS
NAUSEA: 0
DEPRESSION: 0
SHORTNESS OF BREATH: 0
MYALGIAS: 0
BLURRED VISION: 0
HEADACHES: 0
COUGH: 0
CONSTIPATION: 1
DIZZINESS: 0
DOUBLE VISION: 0
FEVER: 0
ABDOMINAL PAIN: 0
CHILLS: 0
NECK PAIN: 0
PALPITATIONS: 0
VOMITING: 0

## 2020-10-21 ASSESSMENT — PAIN DESCRIPTION - PAIN TYPE
TYPE: ACUTE PAIN
TYPE: ACUTE PAIN

## 2020-10-21 ASSESSMENT — PAIN SCALES - GENERAL: PAIN_LEVEL: 0

## 2020-10-21 NOTE — OR NURSING
1215-Received from OR via [facility]. S/P-EGD under general anesthesia.  Bedside report received from RN. And Anesthesiologist.    1230- awake and talking. Denies pain or nausea.     1245-awake, drinking juice.    1310-Report called to floor.     1334-to floor on [facility] with transport. 02 @ 3L nc.

## 2020-10-21 NOTE — ANESTHESIA TIME REPORT
Anesthesia Start and Stop Event Times     Date Time Event    10/21/2020 1125 Ready for Procedure     1140 Anesthesia Start     1218 Anesthesia Stop        Responsible Staff  10/21/20    Name Role Begin End    Ruthy Quintero M.D. Anesth 1140 1218        Preop Diagnosis (Free Text):  Pre-op Diagnosis     VOMITING        Preop Diagnosis (Codes):    Post op Diagnosis  Gastric out let obstruction      Premium Reason  Non-Premium    Comments:

## 2020-10-21 NOTE — ANESTHESIA QCDR
2019 [facility] Clinical Data Registry (for Quality Improvement)     Postoperative nausea/vomiting risk protocol (Adult = 18 yrs and Pediatric 3-17 yrs)- (430 and 463)  General inhalation anesthetic (NOT TIVA) with PONV risk factors: No  Provision of anti-emetic therapy with at least 2 different classes of agents: N/A  Patient DID NOT receive anti-emetic therapy and reason is documented in Medical Record: N/A    Multimodal Pain Management- (477)  Non-emergent surgery AND patient age >= 18: No  Use of Multimodal Pain Management, two or more drugs and/or interventions, NOT including systemic opioids:   Exception: Documented allergy to multiple classes of analgesics:     Smoking Abstinence (404)  Patient is current smoker (cigarette, pipe, e-cig, marijuanna): No  Elective Surgery:   Abstinence instructions provided prior to day of surgery:   Patient abstained from smoking on day of surgery:     Pre-Op Beta-Blocker in Isolated CABG (44)  Isolated CABG AND patient age >= 18: No  Beta-blocker admin within 24 hours of surgical incision:   Exception:of medical reason(s) for not administering beta blocker within 24 hours prior to surgical incision (e.g., not  indicated,other medical reason):     PACU assessment of acute postoperative pain prior to Anesthesia Care End- Applies to Patients Age = 18- (ABG7)  Initial PACU pain score is which of the following: < 7/10  Patient unable to report pain score: N/A    Post-anesthetic transfer of care checklist/protocol to PACU/ICU- (426 and 427)  Upon conclusion of case, patient transferred to which of the following locations: PACU/Non-ICU  Use of transfer checklist/protocol: Yes  Exclusion: Service Performed in Patient Hospital Room (and thus did not require transfer): N/A  Unplanned admission to ICU related to anesthesia service up through end of PACU care- (MD51)  Unplanned admission to ICU (not initially anticipated at anesthesia start time): No

## 2020-10-21 NOTE — DISCHARGE PLANNING
Received Choice form at 1673  Agency/Facility Name: [facility]  Referral sent per Choice form @ [phone]

## 2020-10-21 NOTE — PROCEDURES
DATE OF SERVICE:  10/21/2020    PROCEDURE:  Esophagogastroduodenoscopy with biopsies.    PREPROCEDURE DIAGNOSES:  An 85-year-old female noted to have large gastric   ulcer on recent examination, but due to large amount of retained old blood and   food, the procedure needed to be repeated.  She has a history of   coffee-ground emesis and anemia.  She also has a recent barium esophagram   showing 5 cm hiatal hernia and with reflux of barium up into the proximal   esophagus.    POSTPROCEDURE DIAGNOSES:  1.  6 cm hiatal hernia.  2.  Large gastric ulcer involving at least 50% of the hernia sac.  3.  Retained gastric contents, improved post-metoclopramide therapy.    CONSENT:  Risks, benefits, and alternatives explained to patient.  The patient   gave consent to proceed.    ANESTHESIA:  General endotracheal anesthesia.    ANESTHESIOLOGIST:  Ruthy Quintero MD    The patient was turned in the left lateral decubitus position and anesthesia   monitoring was in place.  The Olympus GIF-1T endoscope was inserted into the   esophagus under direct visualization.  Gastric contents were noted to be   refluxing to the mid esophagus.  This was suctioned away.  There were no   distinct mucosal abnormalities in the esophagus.  The GE junction was located   at 30 cm.  The diaphragmatic hiatus was at 36 cm.  50% of the lumen of the   hernia sac was involved with shallow ulceration with a possible ulcer on the   opposite wall of the hernia sac.  There was adherent debris over that lesion   that I was unable to remove.  There was also a very large mucoid clot in the   center of the ulcer.  I used the snare to try to break up this debris, but it   became too difficult to cut through.  Multiple biopsies were taken.  The ulcer   does not have a malignant appearance.  The endoscope was advanced further   into the stomach and advanced to the antrum.  There were no mucosal   abnormalities in the distal stomach.  Retroflexion was performed and  the same   findings in the proximal stomach were noted.  Retroflexion was taken down and   the endoscope was advanced through the pylorus into the first and second   portions of the duodenum.  There were no mucosal abnormalities.  The endoscope   was advanced back into the stomach.  Air was removed and the endoscope was   removed.  The patient tolerated the procedure well.  No complications.    RECOMMENDATIONS:  1.  Gastric emptying scan.  2.  Carafate slurry 1 g q.i.d. for 2 weeks minimum.  3.  Protonix 40 mg by mouth twice daily.  4.  We will hold on prokinetics until after the gastric emptying scan.  5.  Surgery is following, the patient is not a good surgical candidate.       ____________________________________     MD CAMERON ORELLANA / NTS    DD:  10/21/2020 12:26:34  DT:  10/21/2020 13:56:39    D#:  [id number]  Job#:  [id number]

## 2020-10-21 NOTE — PROGRESS NOTES
Bedside report received.  Assessment complete.  A&O x 4. Irritable at times. Patient calls appropriately.  Patient turns with 2 person assist. Bed alarm on.   Pain managed with prescribed medications.  Denies N&V. NPO for procedure.  + void via purwick, + flatus, 10/21 BM.  Patient denies SOB.  SCD's in place.  Review plan with of care with patient. Call light and personal belongings with in reach. Hourly rounding in place. All needs met at this time.

## 2020-10-21 NOTE — ANESTHESIA POSTPROCEDURE EVALUATION
Patient: Linda Haley    Procedure Summary     Date: 10/21/20 Room / Location: [facility] ROOM 26 / SURGERY SAME DAY [facility]    Anesthesia Start: 1140 Anesthesia Stop: 1218    Procedure: GASTROSCOPY (N/A Esophagus) Diagnosis: (gastric ulcer)    Surgeon: Priscilla Egan M.D. Responsible Provider: Ruthy Quintero M.D.    Anesthesia Type: MAC ASA Status: 3          Final Anesthesia Type: MAC  Last vitals  BP   Blood Pressure : 156/75    Temp   36.2 °C (97.2 °F)    Pulse   Pulse: 78   Resp   18    SpO2   100 %      Anesthesia Post Evaluation    Patient location during evaluation: PACU  Patient participation: complete - patient participated  Level of consciousness: awake and alert  Pain score: 0    Airway patency: patent  Anesthetic complications: no  Cardiovascular status: hemodynamically stable  Respiratory status: acceptable  Hydration status: euvolemic    PONV: none           Nurse Pain Score: 0 (NPRS)

## 2020-10-21 NOTE — CARE PLAN
Problem: Nutritional:  Goal: Achieve adequate nutritional intake  Description: Diet adv vs nutrition support as appropriate with plan of care.   Outcome: NOT MET     Diet order remains NPO since pt is having procedures done today. ADAT post endo per surgery note today.

## 2020-10-21 NOTE — PROGRESS NOTES
Received report from ANGELICA Weiss. Assumed care at 1915. This pt is AOx4, pt unable to ambulate at this time, denies N/V this evening, (-) flatus, voiding with purwick, last BM: 10/19/20 , reporting no pain that is 0/10. Patient and RN discussed plan of care, all questions answered. Labs noted, assessment complete, patient tolerating clear liquids diet, pt NPO at midnight. Call light in place, personal belongings available, bed in lowest position, upper bedrails up, bed alarm on, patient educated on importance of calling for assistance, hourly rounding in place. No additional needs at this time. VSS. Patient refusing 2 RN skin check despite education.

## 2020-10-21 NOTE — PROGRESS NOTES
Assumed care at 0700     Received report from NOC shift RN.    Reviewed recent lab results, notes, orders, and MAR  POC discussed and updated on care board  Bed is in the lowest and locked position, call light within reach       A&Ox4  0.5L nasal canula  CHG bath done  Right upper arm double lumen PICC  Incontinent  +BM  +voiding              Purewick in place  Clear liquid diet   Patient must sit at 90 degree angle  2 Max assist  Denies pain intervention at this time  Q2 turns, waffle mattress, pillows for repositioning

## 2020-10-21 NOTE — OR SURGEON
Immediate Post OP Note    PreOp Diagnosis: large gastric ulcer, coffee grounds emesis, anemia    PostOp Diagnosis:   1. 6 cm hiatal hernia  2. Large gastric ulcer involving at least 50% of hernia sac  3. Retained gastric contents    Procedure(s):  GASTROSCOPY - Wound Class: Clean Contaminated    Surgeon(s):  Priscilla Egan M.D.    Anesthesiologist/Type of Anesthesia:  Anesthesiologist: Ruthy Quintero M.D./General    Surgical Staff:  Endoscopy Technician: Tameka Colón C.N.A.  Endoscopy Nurse: Gagan Ballard R.N.    Specimens removed if any:  ID Type Source Tests Collected by Time Destination   A : gastric ulcer biopsy Tissue Gastric PATHOLOGY SPECIMEN Priscilla Egan M.D. 10/21/2020 12:04 PM        Estimated Blood Loss: none    Findings:   Esophagus:  No mucosal abnormality but gastric contents noted to be refluxing into esoph  Stomach:  GE junction at 30 cm, diaphragmatic hiatus at 36cm.  Large shallow ulcer involving at least 50% hernia sac, no active bleeding, large adherent mucoid clot.  Tried to remove mucoid clot with snare but only able to partially remove.  Multiple biopsies taken of ulcer.  Duodenum: normal    Complications: none    Recs:  1.  Gastric emptying scan  2.  Carafate slurry 1 gm QID x 2 weeks minimum  3.  Protonix 40 mg PO BID  4.  Hold on prokinetic until after GES    10/21/2020 12:16 PM Priscilla Egan M.D.

## 2020-10-21 NOTE — CARE PLAN
Problem: Communication  Goal: The ability to communicate needs accurately and effectively will improve  Outcome: PROGRESSING AS EXPECTED   Participation in POC  Problem: Safety  Goal: Will remain free from injury  Outcome: PROGRESSING AS EXPECTED   Fall precautions in place  Problem: Skin Integrity  Goal: Risk for impaired skin integrity will decrease  Outcome: PROGRESSING AS EXPECTED   Q2 turns, waffle mattress, purewick

## 2020-10-21 NOTE — CARE PLAN
Problem: Communication  Goal: The ability to communicate needs accurately and effectively will improve  Outcome: PROGRESSING AS EXPECTED     Problem: Safety  Goal: Will remain free from injury  Outcome: PROGRESSING AS EXPECTED  Goal: Will remain free from falls  Outcome: PROGRESSING AS EXPECTED     Problem: Knowledge Deficit  Goal: Knowledge of disease process/condition, treatment plan, diagnostic tests, and medications will improve  Outcome: PROGRESSING AS EXPECTED  Note: Pt educated on plan of care. All questions answered at this time.   Goal: Knowledge of the prescribed therapeutic regimen will improve  Outcome: PROGRESSING AS EXPECTED

## 2020-10-21 NOTE — PROGRESS NOTES
Progress Note               Author: Luis Reddy M.D. Date & Time created: 10/21/2020  9:44 AM     Interval History:  Feels fine  Wants a burritto  Tolerating clears   Repeat EGD today   Agree with prokinetics   following        Review of Systems:  Review of Systems   Gastrointestinal: Negative for abdominal pain, nausea and vomiting.   All other systems reviewed and are negative.      Physical Exam:  Physical Exam  Vitals signs and nursing note reviewed. Exam conducted with a chaperone present.   HENT:      Head: Normocephalic.      Nose: Nose normal.   Eyes:      Pupils: Pupils are equal, round, and reactive to light.   Pulmonary:      Breath sounds: Normal breath sounds.   Abdominal:      General: Abdomen is flat. There is no distension.      Palpations: Abdomen is soft.   Musculoskeletal: Normal range of motion.   Neurological:      General: No focal deficit present.      Mental Status: She is alert.   Psychiatric:         Mood and Affect: Mood normal.         Labs:          Recent Labs     10/19/20  0415 10/20/20  0200 10/21/20  0121   SODIUM 132* 131* 132*   POTASSIUM 4.4 4.4 4.1   CHLORIDE 94* 94* 94*   CO2 31 32 33   BUN 28* 24* 17   CREATININE 0.58 0.53 0.51   MAGNESIUM 2.2 2.1  --    PHOSPHORUS 3.1 3.8  --    CALCIUM 9.2 9.6 9.4     Recent Labs     10/19/20  0415 10/20/20  0200 10/21/20  012   ALTSGPT 31 29  --    ASTSGOT 24 22  --    ALKPHOSPHAT 150* 143*  --    TBILIRUBIN 0.2 0.2  --    GLUCOSE 87 76 82     Recent Labs     10/19/20  0415 10/20/20  0200 10/21/20  012   RBC 2.40* 2.34*  --    HEMOGLOBIN 7.5* 7.3* 7.8*   HEMATOCRIT 23.9* 23.5* 24.7*   PLATELETCT 276 289  --      Recent Labs     10/19/20  0415 10/20/20  0200   WBC 10.2 10.2   NEUTSPOLYS 68.70 75.90*   LYMPHOCYTES 18.30* 12.10*   MONOCYTES 5.20 2.60   EOSINOPHILS 6.10 6.00   BASOPHILS 0.00 0.00   ASTSGOT 24 22   ALTSGPT 31 29   ALKPHOSPHAT 150* 143*   TBILIRUBIN 0.2 0.2     Hemodynamics:  Temp (24hrs), Av.5 °C (97.7 °F), Min:36.2 °C  (97.1 °F), Max:36.7 °C (98 °F)  Temperature: 36.7 °C (98 °F)  Pulse  Av  Min: 57  Max: 118   Blood Pressure : 148/71     Respiratory:    Respiration: 18, Pulse Oximetry: 100 %     Work Of Breathing / Effort: Mild  RUL Breath Sounds: Clear, RML Breath Sounds: Diminished, RLL Breath Sounds: Diminished, DAVIDSON Breath Sounds: Clear, LLL Breath Sounds: Diminished  Fluids:    Intake/Output Summary (Last 24 hours) at 10/21/2020 0944  Last data filed at 10/21/2020 0400  Gross per 24 hour   Intake 220 ml   Output 1550 ml   Net -1330 ml        GI/Nutrition:  Orders Placed This Encounter   Procedures   • Diet NPO     Standing Status:   Standing     Number of Occurrences:   8     Order Specific Question:   Restrict to:     Answer:   Sips with Medications [3]     Medical Decision Making, by Problem:  Active Hospital Problems    Diagnosis   • *Gastric outflow obstruction [K31.1]   • Upper GI bleed [K92.2]   • Type 2 diabetes mellitus without complication, with long-term current use of insulin (HCC) [E11.9, Z79.4]   • Gastric mass [K31.89]   • Hypothyroidism [E03.8]   • Hypomagnesemia [E83.42]   • Recurrent left pleural effusion [J90]   • Hiatal hernia [K44.9]   • Aspiration pneumonia (HCC) [J69.0]       Plan:  egd   protonix  reglan   ADAT post endo    30 minutes     Quality-Core Measures   Reviewed items::  Labs reviewed and Medications reviewed  DVT prophylaxis pharmacological::  Contraindicated - High bleeding risk

## 2020-10-21 NOTE — OR NURSING
1115- Pt arrived with transport via [facility]. Pt A, A & O x 4, VSS, FSBS 77, ENDO RN notified. Reviewed the plan of care with the pt.  1130 Dr. Quintero at the bedside.    Consents signed, PICC line Flushed (red port flushes easy) Purple port will not flush.

## 2020-10-21 NOTE — PROGRESS NOTES
"Hospital Medicine Daily Progress Note    Date of Service  10/21/2020    Chief Complaint  85 y.o. female admitted 10/15/2020 with vomiting and possible gastric outlet obstruction    Hospital Course    \"50-year-old female with a past medical history of hypothyroidism, diabetes mellitus, GI bleed, gastritis, normocytic anemia, respiratory failure, aspiration pneumonia, and hiatal hernia was transferred from [facility] for potential gastric outlet obstruction secondary to hiatal hernia requiring total parenteral nutrition.  Pull through without any signs of obstruction.  Patient was started on liquid diet and TPN was tapered.  She underwent EGD on 10/19/2020 for which she was noted for  large amount of retained coffee-ground like debris and solid vegetable Matter, Abnormal fundic mucosa with question of mass (Bx taken) and Abnormal fundic mucosa with question of mass.\"        Interval Problem Update  No acute events overnight.  No acute complaints this morning.  Repeat EGD planned for today.     Consultants/Specialty  General Surgery - Dr. Reddy.   Gastroenterology - Dr. Egan.      Code Status  DNAR/DNI    Disposition  Inpatient    Review of Systems  Review of Systems   Constitutional: Positive for malaise/fatigue. Negative for chills and fever.   HENT: Negative for hearing loss and tinnitus.    Eyes: Negative for blurred vision and double vision.   Respiratory: Negative for cough and shortness of breath.    Cardiovascular: Negative for chest pain and palpitations.   Gastrointestinal: Positive for constipation. Negative for vomiting.   Genitourinary: Negative for dysuria and urgency.   Musculoskeletal: Negative for myalgias and neck pain.   Skin: Negative for itching and rash.   Neurological: Negative for dizziness and headaches.   Psychiatric/Behavioral: Negative for depression and suicidal ideas.        Physical Exam  Temp:  [36.2 °C (97.1 °F)-36.7 °C (98 °F)] 36.2 °C (97.2 °F)  Pulse:  [69-81] 78  Resp:  " [18-20] 18  BP: (119-156)/(63-83) 119/63  SpO2:  [95 %-100 %] 95 %    Physical Exam  Constitutional:       General: She is not in acute distress.     Appearance: Normal appearance.   HENT:      Head: Normocephalic and atraumatic.   Eyes:      Extraocular Movements: Extraocular movements intact.      Pupils: Pupils are equal, round, and reactive to light.   Neck:      Musculoskeletal: Normal range of motion and neck supple.   Cardiovascular:      Rate and Rhythm: Normal rate and regular rhythm.      Pulses: Normal pulses.      Heart sounds: Normal heart sounds.   Pulmonary:      Effort: Pulmonary effort is normal.      Breath sounds: No rales.   Abdominal:      General: Bowel sounds are normal. There is distension (stable).      Palpations: Abdomen is soft.   Musculoskeletal: Normal range of motion.         General: No tenderness.      Comments: mild b/l LE edema, stable   Skin:     General: Skin is dry.      Coloration: Skin is not jaundiced.   Neurological:      General: No focal deficit present.      Mental Status: She is alert. Mental status is at baseline.   Psychiatric:         Mood and Affect: Mood normal.         Fluids    Intake/Output Summary (Last 24 hours) at 10/21/2020 1306  Last data filed at 10/21/2020 1218  Gross per 24 hour   Intake 320 ml   Output 1450 ml   Net -1130 ml       Laboratory  Recent Labs     10/19/20  0415 10/20/20  0200 10/21/20  0121   WBC 10.2 10.2  --    RBC 2.40* 2.34*  --    HEMOGLOBIN 7.5* 7.3* 7.8*   HEMATOCRIT 23.9* 23.5* 24.7*   MCV 99.6* 100.4*  --    MCH 31.3 31.2  --    MCHC 31.4* 31.1*  --    RDW 53.6* 53.6*  --    PLATELETCT 276 289  --    MPV 10.3 9.9  --      Recent Labs     10/19/20  0415 10/20/20  0200 10/21/20  0121   SODIUM 132* 131* 132*   POTASSIUM 4.4 4.4 4.1   CHLORIDE 94* 94* 94*   CO2 31 32 33   GLUCOSE 87 76 82   BUN 28* 24* 17   CREATININE 0.58 0.53 0.51   CALCIUM 9.2 9.6 9.4                   Imaging  IR-PICC LINE PLACEMENT W/ GUIDANCE > AGE 5   Final Result                   Ultrasound-guided PICC placement performed by qualified nursing staff as    above.          DX-UPPER GI-SMALL BOWEL FOLLOW THRU   Final Result      1.  Negative small bowel follow-through. No obstruction      2.  Moderate thoracic esophageal dysmotility and the esophagus is mildly dilated.      DX-CHEST-FOR LINE PLACEMENT Perform procedure in: Patient's Room   Final Result      Left subclavian central line identified with tip in expected location of the superior vena cava.      Left pleural fluid and left basilar consolidation.      OUTSIDE IMAGES-CT ABDOMEN /PELVIS   Final Result      OUTSIDE IMAGES-CT ABDOMEN /PELVIS   Final Result      OUTSIDE IMAGES-DX ABDOMEN   Final Result      [id number] FILM X-RAY   Final Result      OUTSIDE IMAGES-US VASCULAR   Final Result      OUTSIDE IMAGES-DX ABDOMEN   Final Result           Assessment/Plan  * Gastric outflow obstruction- (present on admission)  Assessment & Plan  -  GI onboard; appreciated.  10/19 EGD showed large amount of retained coffee-ground like debris and solid vegetable matter, fundic mucosa with question of mass (bx pending).   - GI onboard; appreciated.  Repeat EGD today, 10/21.  Further recs per GI depending on EGD results.   - Follow up biopsies.   - Will likely need gastric emptying study.      Upper GI bleed- (present on admission)  Assessment & Plan  - With anemia.  Hgb remains stable.  Transfuse 1 unit packed RBCs if Hgb < 7 or patient symptomatic.  - GI onboard; appreciated.  Repeat EGD today, 10/21.  Further recs per GI depending on EGD results.   - Monitor CBC.     Type 2 diabetes mellitus without complication, with long-term current use of insulin ([facility])  Assessment & Plan  - Continue correctional insulin.  - Hypoglycemia protocol.     Gastric mass- (present on admission)  Assessment & Plan  - 10/19 EGD notes possible gastric mass.   - GI onboard.  Repeat EGD today, 10/21, for further biopsies.   - Follow up bx.      Hypothyroidism- (present on admission)  Assessment & Plan  - Continue home levothyroxine.     Recurrent left pleural effusion- (present on admission)  Assessment & Plan  - Noted on outside chest CT and on 10/15 CXR.   - Completed IV antibiotic for aspiration pneumonia at outside facility.   - 10/2020 ECHO w/EF 70%, Pro-BNP < 400.   - Patient saturating well on 1 L nasal cannula.   - Likely secondary to atelectasis from large hiatal hernia.   - Continue IS.    Hypomagnesemia  Assessment & Plan  - Replete PRN.     Aspiration pneumonia (HCC)- (present on admission)  Assessment & Plan  - Completed antibiotic regimen at outside facility.  Further abx has been stopped due to possible gastric outlet obstruction.   - No signs of active infection.  Monitor.    - SLP onboard; appreciated.    Hiatal hernia- (present on admission)  Assessment & Plan  - Patient evaluated by Surgery at outside facility, who recommended transfer to [facility] for Surgery/GI evaluation.   - General Surgery onboard; appreciated.       VTE prophylaxis: Chemical prophylaxis contraindicated due to upper GI bleed.  SCDs ordered.    I have performed a physical exam and reviewed and updated ROS and Plan today (10/21/20). In review of yesterday's note (10/20/20), changes are noted above.      Ellie Leung M.D.     [Normal] : cervix [No Bleeding] : there was no active vaginal bleeding [Uterine Adnexae] : were not tender and not enlarged [FreeTextEntry5] : healing well

## 2020-10-21 NOTE — DISCHARGE PLANNING
Anticipated Discharge Disposition: SNF    Action: Received SNF Order. LSW met with the patient at bedside to issue choice. The patient stated her first choice is [facility]; however, if [facility] is not able to accept, the patient will consider a facility in [facility], choice form provided to [facility].    Barriers to Discharge: Medical Clearance.    Plan: SNF, pending medical clearance, accepting facility, and transfer arrangements.

## 2020-10-21 NOTE — ANESTHESIA PREPROCEDURE EVALUATION
Relevant Problems   PULMONARY   (+) Aspiration pneumonia (HCC)      GI   (+) Hiatal hernia      ENDO   (+) Hypothyroidism   (+) Type 2 diabetes mellitus without complication, with long-term current use of insulin (HCC)       Physical Exam    Airway   Mallampati: II  TM distance: >3 FB  Neck ROM: full       Cardiovascular - normal exam  Rhythm: regular  Rate: normal  (-) murmur     Dental - normal exam           Pulmonary - normal exam  Breath sounds clear to auscultation     Abdominal    Neurological - normal exam                 Anesthesia Plan    ASA 3   ASA physical status 3 criteria: diabetes - poorly controlled    Plan - MAC             Induction: intravenous    Postoperative Plan: Postoperative administration of opioids is intended.    Pertinent diagnostic labs and testing reviewed    Informed Consent:    Anesthetic plan and risks discussed with patient.    Use of blood products discussed with: patient whom consented to blood products.

## 2020-10-21 NOTE — CARE PLAN
Problem: Safety  Goal: Will remain free from injury  Outcome: PROGRESSING AS EXPECTED  Note: Patient free from accidental injury at this time. Safety precautions in place. Hourly rounding in place.      Problem: Skin Integrity  Goal: Risk for impaired skin integrity will decrease  Outcome: PROGRESSING AS EXPECTED  Note: Q2 turns in place. Heels floated.

## 2020-10-22 ENCOUNTER — APPOINTMENT (OUTPATIENT)
Dept: RADIOLOGY | Facility: MEDICAL CENTER | Age: 85
DRG: 380 | End: 2020-10-22
Attending: INTERNAL MEDICINE
Payer: MEDICARE

## 2020-10-22 LAB
ANION GAP SERPL CALC-SCNC: 4 MMOL/L (ref 7–16)
BASOPHILS # BLD AUTO: 0.5 % (ref 0–1.8)
BASOPHILS # BLD: 0.05 K/UL (ref 0–0.12)
BUN SERPL-MCNC: 13 MG/DL (ref 8–22)
CALCIUM SERPL-MCNC: 9.2 MG/DL (ref 8.5–10.5)
CHLORIDE SERPL-SCNC: 97 MMOL/L (ref 96–112)
CO2 SERPL-SCNC: 34 MMOL/L (ref 20–33)
CREAT SERPL-MCNC: 0.52 MG/DL (ref 0.5–1.4)
EOSINOPHIL # BLD AUTO: 0.34 K/UL (ref 0–0.51)
EOSINOPHIL NFR BLD: 3.3 % (ref 0–6.9)
ERYTHROCYTE [DISTWIDTH] IN BLOOD BY AUTOMATED COUNT: 56 FL (ref 35.9–50)
GLUCOSE SERPL-MCNC: 93 MG/DL (ref 65–99)
HCT VFR BLD AUTO: 25.6 % (ref 37–47)
HGB BLD-MCNC: 7.8 G/DL (ref 12–16)
IMM GRANULOCYTES # BLD AUTO: 0.4 K/UL (ref 0–0.11)
IMM GRANULOCYTES NFR BLD AUTO: 3.9 % (ref 0–0.9)
LYMPHOCYTES # BLD AUTO: 1.44 K/UL (ref 1–4.8)
LYMPHOCYTES NFR BLD: 14.1 % (ref 22–41)
MCH RBC QN AUTO: 31.7 PG (ref 27–33)
MCHC RBC AUTO-ENTMCNC: 30.5 G/DL (ref 33.6–35)
MCV RBC AUTO: 104.1 FL (ref 81.4–97.8)
MONOCYTES # BLD AUTO: 0.83 K/UL (ref 0–0.85)
MONOCYTES NFR BLD AUTO: 8.1 % (ref 0–13.4)
NEUTROPHILS # BLD AUTO: 7.18 K/UL (ref 2–7.15)
NEUTROPHILS NFR BLD: 70.1 % (ref 44–72)
NRBC # BLD AUTO: 0.12 K/UL
NRBC BLD-RTO: 1.2 /100 WBC
PLATELET # BLD AUTO: 337 K/UL (ref 164–446)
PMV BLD AUTO: 9.7 FL (ref 9–12.9)
POTASSIUM SERPL-SCNC: 4.1 MMOL/L (ref 3.6–5.5)
RBC # BLD AUTO: 2.46 M/UL (ref 4.2–5.4)
SODIUM SERPL-SCNC: 135 MMOL/L (ref 135–145)
WBC # BLD AUTO: 10.2 K/UL (ref 4.8–10.8)

## 2020-10-22 PROCEDURE — 700111 HCHG RX REV CODE 636 W/ 250 OVERRIDE (IP): Performed by: HOSPITALIST

## 2020-10-22 PROCEDURE — A9541 TC99M SULFUR COLLOID: HCPCS

## 2020-10-22 PROCEDURE — 700101 HCHG RX REV CODE 250: Performed by: HOSPITALIST

## 2020-10-22 PROCEDURE — C9113 INJ PANTOPRAZOLE SODIUM, VIA: HCPCS | Performed by: HOSPITALIST

## 2020-10-22 PROCEDURE — 80048 BASIC METABOLIC PNL TOTAL CA: CPT

## 2020-10-22 PROCEDURE — 94760 N-INVAS EAR/PLS OXIMETRY 1: CPT

## 2020-10-22 PROCEDURE — 770001 HCHG ROOM/CARE - MED/SURG/GYN PRIV*

## 2020-10-22 PROCEDURE — 700102 HCHG RX REV CODE 250 W/ 637 OVERRIDE(OP): Performed by: INTERNAL MEDICINE

## 2020-10-22 PROCEDURE — 700102 HCHG RX REV CODE 250 W/ 637 OVERRIDE(OP): Performed by: HOSPITALIST

## 2020-10-22 PROCEDURE — A9270 NON-COVERED ITEM OR SERVICE: HCPCS | Performed by: INTERNAL MEDICINE

## 2020-10-22 PROCEDURE — 99232 SBSQ HOSP IP/OBS MODERATE 35: CPT | Performed by: STUDENT IN AN ORGANIZED HEALTH CARE EDUCATION/TRAINING PROGRAM

## 2020-10-22 PROCEDURE — A9270 NON-COVERED ITEM OR SERVICE: HCPCS | Performed by: HOSPITALIST

## 2020-10-22 PROCEDURE — 85025 COMPLETE CBC W/AUTO DIFF WBC: CPT

## 2020-10-22 RX ADMIN — PANTOPRAZOLE SODIUM 40 MG: 40 INJECTION, POWDER, LYOPHILIZED, FOR SOLUTION INTRAVENOUS at 16:33

## 2020-10-22 RX ADMIN — LEVOTHYROXINE SODIUM 100 MCG: 0.05 TABLET ORAL at 06:23

## 2020-10-22 RX ADMIN — GABAPENTIN 200 MG: 100 CAPSULE ORAL at 16:32

## 2020-10-22 RX ADMIN — SUCRALFATE 1 G: 1 SUSPENSION ORAL at 00:45

## 2020-10-22 RX ADMIN — SUCRALFATE 1 G: 1 SUSPENSION ORAL at 16:32

## 2020-10-22 RX ADMIN — SODIUM CHLORIDE, PRESERVATIVE FREE 10 ML: 5 INJECTION INTRAVENOUS at 06:24

## 2020-10-22 RX ADMIN — PANTOPRAZOLE SODIUM 40 MG: 40 INJECTION, POWDER, LYOPHILIZED, FOR SOLUTION INTRAVENOUS at 06:23

## 2020-10-22 RX ADMIN — SUCRALFATE 1 G: 1 SUSPENSION ORAL at 13:37

## 2020-10-22 RX ADMIN — NYSTATIN: 100000 POWDER TOPICAL at 16:32

## 2020-10-22 RX ADMIN — SUCRALFATE 1 G: 1 SUSPENSION ORAL at 06:23

## 2020-10-22 RX ADMIN — NYSTATIN: 100000 POWDER TOPICAL at 06:24

## 2020-10-22 RX ADMIN — SODIUM CHLORIDE, PRESERVATIVE FREE 10 ML: 5 INJECTION INTRAVENOUS at 16:34

## 2020-10-22 ASSESSMENT — ENCOUNTER SYMPTOMS
DIARRHEA: 0
VOMITING: 0
DEPRESSION: 0
FEVER: 0
NAUSEA: 0
HEADACHES: 0
MYALGIAS: 0
NECK PAIN: 0
BLURRED VISION: 0
SHORTNESS OF BREATH: 0
DOUBLE VISION: 0
CONSTIPATION: 1
PALPITATIONS: 0
CHILLS: 0
COUGH: 1
ABDOMINAL PAIN: 0
BLOOD IN STOOL: 0
COUGH: 0
DIZZINESS: 0

## 2020-10-22 ASSESSMENT — PAIN DESCRIPTION - PAIN TYPE: TYPE: ACUTE PAIN

## 2020-10-22 NOTE — PROGRESS NOTES
Progress Note               Author: Luis Reddy M.D. Date & Time created: 10/22/2020  3:05 PM     Interval History:  Clinically improved   EGD results reviewed   Giant gastric ulcer   Applaud and endorse GI Rx plan   Gastric emptying study pending   Surgical intervention not currently indicated     Review of Systems:  Review of Systems   Gastrointestinal: Negative for abdominal pain, nausea and vomiting.   All other systems reviewed and are negative.      Physical Exam:  Physical Exam  Vitals signs and nursing note reviewed. Exam conducted with a chaperone present.   HENT:      Head: Normocephalic.      Nose: Nose normal.   Eyes:      Pupils: Pupils are equal, round, and reactive to light.   Pulmonary:      Breath sounds: Normal breath sounds.   Abdominal:      General: Abdomen is flat. There is no distension.      Palpations: Abdomen is soft.   Musculoskeletal: Normal range of motion.   Neurological:      General: No focal deficit present.      Mental Status: She is alert.   Psychiatric:         Mood and Affect: Mood normal.         Labs:          Recent Labs     10/20/20  0200 10/21/20  0121 10/22/20  0100   SODIUM 131* 132* 135   POTASSIUM 4.4 4.1 4.1   CHLORIDE 94* 94* 97   CO2 32 33 34*   BUN 24* 17 13   CREATININE 0.53 0.51 0.52   MAGNESIUM 2.1  --   --    PHOSPHORUS 3.8  --   --    CALCIUM 9.6 9.4 9.2     Recent Labs     10/20/20  0200 10/21/20  0121 10/22/20  0100   ALTSGPT 29  --   --    ASTSGOT 22  --   --    ALKPHOSPHAT 143*  --   --    TBILIRUBIN 0.2  --   --    GLUCOSE 76 82 93     Recent Labs     10/20/20  0200 10/21/20  0121 10/22/20  0100   RBC 2.34*  --  2.46*   HEMOGLOBIN 7.3* 7.8* 7.8*   HEMATOCRIT 23.5* 24.7* 25.6*   PLATELETCT 289  --  337     Recent Labs     10/20/20  0200 10/22/20  0100   WBC 10.2 10.2   NEUTSPOLYS 75.90* 70.10   LYMPHOCYTES 12.10* 14.10*   MONOCYTES 2.60 8.10   EOSINOPHILS 6.00 3.30   BASOPHILS 0.00 0.50   ASTSGOT 22  --    ALTSGPT 29  --    ALKPHOSPHAT 143*  --     TBILIRUBIN 0.2  --      Hemodynamics:  Temp (24hrs), Av.8 °C (98.2 °F), Min:36.6 °C (97.9 °F), Max:37.1 °C (98.7 °F)  Temperature: 36.6 °C (97.9 °F)  Pulse  Av  Min: 57  Max: 118   Blood Pressure : 151/76     Respiratory:    Respiration: 16, Pulse Oximetry: 100 %     Work Of Breathing / Effort: Mild  RUL Breath Sounds: Clear, RML Breath Sounds: Diminished, RLL Breath Sounds: Diminished, DAVIDSON Breath Sounds: Clear, LLL Breath Sounds: Diminished  Fluids:    Intake/Output Summary (Last 24 hours) at 10/22/2020 1505  Last data filed at 10/22/2020 0809  Gross per 24 hour   Intake 960 ml   Output 750 ml   Net 210 ml        GI/Nutrition:  Orders Placed This Encounter   Procedures   • Diet Order Full Liquid     Standing Status:   Standing     Number of Occurrences:   1     Order Specific Question:   Diet:     Answer:   Full Liquid [11]     Medical Decision Making, by Problem:  Active Hospital Problems    Diagnosis   • *Gastric outflow obstruction [K31.1]   • Upper GI bleed [K92.2]   • Type 2 diabetes mellitus without complication, with long-term current use of insulin (HCC) [E11.9, Z79.4]   • Gastric mass [K31.89]   • Hypothyroidism [E03.8]   • Hypomagnesemia [E83.42]   • Recurrent left pleural effusion [J90]   • Hiatal hernia [K44.9]   • Aspiration pneumonia (HCC) [J69.0]       Plan:  As above , 30 minutes     Quality-Core Measures   Reviewed items::  Labs reviewed and Medications reviewed  DVT prophylaxis pharmacological::  Contraindicated - High bleeding risk

## 2020-10-22 NOTE — RESPIRATORY CARE
Oxygen Rounds      Patient found on    O2 L/m:  _____1____    Oxygen device:  __NC______   Spo2: ___99______%      Respiratory device skin site inspection completed.

## 2020-10-22 NOTE — PROGRESS NOTES
2 RN skin check complete with Tanja DOMINGUEZ  SCD's in place. Skin assessed under SCD's small scabs to bilateral shins.  Small flaking patch of skin possible scab to L ear, under O2 tubing. R ear under O2 tubing pink and blanching.  Sacrum pink and blanching. Q2 turns in place however patient is not always compliant with turns, patient is also incontinent of stool and urine. Purwick in place for urine.  Possible DTI to L heel, photo taken. Skin red and purple.  R heel pink and blanching.  Heels floated with pillows  Scab to R inner thigh  Wound consult placed.

## 2020-10-22 NOTE — PROGRESS NOTES
Received report from ANGELICA Weeks. Assumed care at 1915. This pt is AOx4, patient unable to ambulate at this time, denies N/V this evening, (+) flatus, voiding appropriately, last BM: 10/21/2020, reporting no pain that is 0/10, medicated per MAR. Patient and RN discussed plan of care, all questions answered. Labs noted, assessment complete, patient tolerating full liquid diet. Call light in place, personal belongings available, bed in lowest position, upper bedrails up, bed alarm on, patient educated on importance of calling for assistance, hourly rounding in place. No additional needs at this time. VSS. Patient allowing this RN tonight to complete a skin check after education regarding it's importance d/t a new potential injury found.

## 2020-10-22 NOTE — PROGRESS NOTES
AA&Ox4.   SpO2 >90% on 1L via NC. Denies SOB.  Denies any pain.  Tolerating full liquid diet. Denies N/V.  + void.   LBM 10/21.   Max assist. Q2h turns.   All needs met at this time. Call light within reach. Pt calls appropriately.

## 2020-10-22 NOTE — CARE PLAN
Problem: Nutritional:  Goal: Nutrition support tolerated and meeting greater than 85% of estimated needs  Outcome: DISCHARGED-GOAL NOT MET    TPN provided 50% of estimated needs 10/16-10/17.       Problem: Nutritional:  Goal: Achieve adequate nutritional intake  Description: Patient will consume 50% of meals  Outcome: PROGRESSING AS EXPECTED     See RD note.

## 2020-10-22 NOTE — PROGRESS NOTES
Gastroenterology Progress Note     Author: Priscilla Egan M.D.   Date & Time Created: 10/22/2020 1:12 PM    Chief Complaint:  Vomiting    History of Presenting Illness  85 y.o. female with HTN, hyperlipidemia, DM who was transferred from OSH 10/15/2020 for vomiting and concern for gastric outlet obstruction and small bowel obstruction.     Most of history obtained from review of chart and she is poor historian overall.     She apparently initially presented with nausea and vomiting.  Initially felt to have possible small bowel obstruction treated conservatively.  Still had persistent vomiting, unclear if related to oral intake or even occurred without eating.  Additional evaluation with EGD showed retained fluid and food within esophagus and hiatal hernia (almost 600 mL) per report as well as possible slight narrowing of pylorus.  Gastric biopsies negative for H pylori.  Difficult to maneuver scope through hiatal hernia.  UGI series showed slow emptying of hiatal hernia sac but no SBFT performed.  She has been on TPN  But is also taking clear liquids per report.  She denies issues currently.  She is having BMs per her.  Denies abdominal pain.     She has been seen by surgery and is currently undergoing UGI SBFT. She did have EGD 2015 showing 5 cm HH with [place] erosions and MW tear.    Interval History:  10/17/2020: UGI SBFT yesterday shows esophageal dysmotility but no obvious hiatal hernia or gastric outlet obstruction and normal small bowel transit.  She wants to eat and drink.  She is difficult historian.  When asked if she has had ongoing dysphagia, vomiting, heartburn, regurgitation at home prior to hospital stay, she denies but unclear.  I reviewed imaging with Dr. Chambers radiology today.  He feels that UGI SBFT does show large hiatal hernia with some delayed exit of contrast likely contributing to possible reflux or delayed clearance from esophagus but limited images.  Prior CT with large hiatal  hernia as well.    10/18/2020:  Surgery not planning any surgery currently.  Tolerating full liquid diet.  No BM for 3 days.  Hgb dropped from 10 to 7 but no overt bleeding seen.  10/19/2020:  Large amount of coffee grounds and food in stomach on EGD limiting visualization.  Appeared to have large fundic ulcer.  10/20/2020:  No specific complaints    10/22/2020: Denies complaints today including n/v, abd pain. Does not look at her BMs. We discuss finding the right light switch for the blue skylight  GES done for 90 minutes showed delayed emptying.     EGD 10/21 showed 6 cm HH from 36-30cm with large  Ulcer covered with adherent debris consuming 50% of the lumen. Biopsied.      Review of Systems:  Review of Systems   Constitutional: Positive for malaise/fatigue. Negative for chills and fever.   Respiratory: Positive for cough.    Gastrointestinal: Negative for abdominal pain, blood in stool, diarrhea, melena, nausea and vomiting.   All other systems reviewed and are negative.      Physical Exam:  Physical Exam  Vitals signs and nursing note reviewed.   Constitutional:       General: She is not in acute distress.     Appearance: She is not ill-appearing or toxic-appearing.      Comments: Elderly appearing   Cardiovascular:      Rate and Rhythm: Normal rate and regular rhythm.   Pulmonary:      Effort: Pulmonary effort is normal. No respiratory distress.      Breath sounds: No stridor. No wheezing or rales.   Abdominal:      General: Abdomen is flat. Bowel sounds are normal. There is no distension.      Palpations: Abdomen is soft. There is no mass.      Tenderness: There is no abdominal tenderness. There is no guarding or rebound.      Hernia: No hernia is present.   Neurological:      General: No focal deficit present.      Mental Status: She is alert and oriented to person, place, and time.         Labs:          Recent Labs     10/20/20  0200 10/21/20  0121 10/22/20  0100   SODIUM 131* 132* 135   POTASSIUM 4.4 4.1  4.1   CHLORIDE 94* 94* 97   CO2 32 33 34*   BUN 24* 17 13   CREATININE 0.53 0.51 0.52   MAGNESIUM 2.1  --   --    PHOSPHORUS 3.8  --   --    CALCIUM 9.6 9.4 9.2     Recent Labs     10/20/20  0200 10/21/20  0121 10/22/20  0100   ALTSGPT 29  --   --    ASTSGOT 22  --   --    ALKPHOSPHAT 143*  --   --    TBILIRUBIN 0.2  --   --    GLUCOSE 76 82 93     Recent Labs     10/20/20  0200 10/21/20  0121 10/22/20  0100   RBC 2.34*  --  2.46*   HEMOGLOBIN 7.3* 7.8* 7.8*   HEMATOCRIT 23.5* 24.7* 25.6*   PLATELETCT 289  --  337     Recent Labs     10/20/20  0200 10/22/20  0100   WBC 10.2 10.2   NEUTSPOLYS 75.90* 70.10   LYMPHOCYTES 12.10* 14.10*   MONOCYTES 2.60 8.10   EOSINOPHILS 6.00 3.30   BASOPHILS 0.00 0.50   ASTSGOT 22  --    ALTSGPT 29  --    ALKPHOSPHAT 143*  --    TBILIRUBIN 0.2  --      Hemodynamics:  Temp (24hrs), Av.8 °C (98.2 °F), Min:36.6 °C (97.9 °F), Max:37.1 °C (98.7 °F)  Temperature: 36.6 °C (97.9 °F)  Pulse  Av  Min: 57  Max: 118   Blood Pressure : 151/76     Respiratory:    Respiration: 16, Pulse Oximetry: 100 %     Work Of Breathing / Effort: Mild  RUL Breath Sounds: Clear, RML Breath Sounds: Diminished, RLL Breath Sounds: Diminished, DAVIDSON Breath Sounds: Clear, LLL Breath Sounds: Diminished  Fluids:    Intake/Output Summary (Last 24 hours) at 10/17/2020 1127  Last data filed at 10/17/2020 0343  Gross per 24 hour   Intake 0 ml   Output 2200 ml   Net -2200 ml        GI/Nutrition:  Orders Placed This Encounter   Procedures   • Diet Order Full Liquid     Standing Status:   Standing     Number of Occurrences:   1     Order Specific Question:   Diet:     Answer:   Full Liquid [11]     Medical Decision Making, by Problem:  Active Hospital Problems    Diagnosis   • *Gastric outflow obstruction [K31.1]   • Anemia [D64.9]   • GI bleed [K92.2]   • Hypomagnesemia [E83.42]   • Recurrent left pleural effusion [J90]   • Hiatal hernia [K44.9]   • Aspiration pneumonia (HCC) [J69.0]     PROBLEMS:  1.  Fundic ulcer, no  malignant cells on path but not adequate tissue due to amount of debris on the ulcer  2.  Anemia, acute blood loss  3. Nausea and vomiting. Question dysmotility  4. Abnormal UGI series.  Initial UGI SBFT here read as no hiatal hernia but after review with radiology today, he does feel there is large hiatal hernia with delayed clearance of contrast from hernia contributing to delayed emptying from esophagus.  However, contrast does flow through hernia and empties out of stomach and traverses small bowel normally    5. Hiatal hernia  6. Possible recent small bowel obstruction  7. Hypertension  8. Hyperlipidemia  9. Diabetes mellitus  10. Protein-calorie malnutrition, moderate    I hesitate to diagnose gastroparesis as GES should be 4 hours. Further, the size of her hiatal hernia should cause some degree of obstruction/slowing.     Plan:  1. Advance diet to fulls and then as tolerated  2. Reglan prn ()  3. Carafate 1g QID for two weeks  4. Continue BID pantoprazole  5. F/u in GI clinic    Darwin Blake MD, [facility]      Quality-Core Measures   Reviewed items::  Medications reviewed, Labs reviewed and Radiology images reviewed

## 2020-10-22 NOTE — DISCHARGE PLANNING
Agency/Facility Name: [facility]   Outcome: Per Epic, referral accepted.     Agency/Facility Name: [facility]   Spoke To: Rosa   Outcome: Needs to know how long pt will be on TPN.    Agency/Facility Name: [facility]   Spoke To: Inderjit  Outcome: Pt will need to be off TPN to accept.     Agency/Facility Name: [facility]   Outcome: Per Epic, referral declined, care exceeds capacity, cannot take TPN.     @0956  Agency/Facility Name: [facility]   Spoke To: Rosa   Outcome: Notified Rosa, TPN stopped on 10/17.    Agency/Facility Name: [facility]   Outcome: Per Epic, referral declined due to no open beds.     @1032  Agency/Facility Name: [facility]  Spoke To: Tamara   Outcome: Notified Tamara pt TPN stopped 10/17.Referral re-sent per request.     Agency/Facility Name: [facility]  Outcome: Per Epic, referral accepted.

## 2020-10-22 NOTE — PROGRESS NOTES
2 RN skin check done with ANGELICA Jasmine.   Devices in place: nasal cannula, purwick, SCDs.  Skin checked under all devices.  Skin is pink and blanching on bridge of nose. Patient is wearing glasses.   Small scabs noted to bilateral shins and (R) thigh.   Redness and bruising noted in james area. Frequent placement check of purwick in place.   Slugglish blanching and redness noted on (R) heel.   Possible pressure injury noted on (L) heel, skin is red and purple.   Sacrum is red and has sluggish blanching. Purwick in place for incontinence. Pt also incontinent of stool.   Heels floated with pillows, mepilexes placed on bilateral heels, padding on nasal cannula tubing, Q2hr turns, and waffle overlay in place.

## 2020-10-22 NOTE — DIETARY
Nutrition Services: Brief Update   Day 7 of admit.  Linda Haley is a 85 y.o. female with admitting DX of Gastric obstruction    EGD with biopsies 10/21, gastric emptying scan recommended. TPN discontinued 10/17. Diet advanced to full liquid yesterday. PO intake % x2 meals. LBM: 10/21.     Malnutrition Risk: Does not meet criteria at this time     Recommendations/Plan:   1. Continue diet advancements as appropriate  2. Encourage intake of meals  3. Document intake of all meals as % taken in ADL's to provide interdisciplinary communication across all shifts.   4. Monitor weight.  5. Nutrition rep will continue to see patient for ongoing meal and snack preferences.  6. Obtain supplement order per RD as needed.    RD following

## 2020-10-22 NOTE — PROGRESS NOTES
"Hospital Medicine Daily Progress Note    Date of Service  10/22/2020    Chief Complaint  85 y.o. female admitted 10/15/2020 with vomiting and possible gastric outlet obstruction    Hospital Course    \"50-year-old female with a past medical history of hypothyroidism, diabetes mellitus, GI bleed, gastritis, normocytic anemia, respiratory failure, aspiration pneumonia, and hiatal hernia was transferred from [facility] for potential gastric outlet obstruction secondary to hiatal hernia requiring total parenteral nutrition.  Pull through without any signs of obstruction.  Patient was started on liquid diet and TPN was tapered.  She underwent EGD on 10/19/2020 for which she was noted for  large amount of retained coffee-ground like debris and solid vegetable Matter, Abnormal fundic mucosa with question of mass (Bx taken) and Abnormal fundic mucosa with question of mass.\"        Interval Problem Update  No acute events overnight.  No acute complaints this morning.  Plan for gastric emptying study today.  Had extensive conversation with patient's granddaughter, Citlalli, who understands that patient will be going to skilled nursing facility after discharge from here prior to returning to her assisted living facility.    Consultants/Specialty  General Surgery - Dr. Reddy.   Gastroenterology - Dr. Egan.      Code Status  DNAR/DNI    Disposition  Inpatient    Review of Systems  Review of Systems   Constitutional: Positive for malaise/fatigue. Negative for chills and fever.   HENT: Negative for hearing loss and tinnitus.    Eyes: Negative for blurred vision and double vision.   Respiratory: Negative for cough and shortness of breath.    Cardiovascular: Negative for chest pain and palpitations.   Gastrointestinal: Positive for constipation (improving). Negative for vomiting.   Genitourinary: Negative for dysuria and urgency.   Musculoskeletal: Negative for myalgias and neck pain.   Skin: Negative for itching and rash. "   Neurological: Negative for dizziness and headaches.   Psychiatric/Behavioral: Negative for depression and suicidal ideas.        Physical Exam  Temp:  [36.6 °C (97.9 °F)-37.1 °C (98.7 °F)] 36.6 °C (97.9 °F)  Pulse:  [] 79  Resp:  [16-19] 16  BP: (119-151)/(63-89) 151/76  SpO2:  [94 %-100 %] 100 %    Physical Exam  Constitutional:       General: She is not in acute distress.     Appearance: Normal appearance.   HENT:      Head: Normocephalic and atraumatic.   Eyes:      Extraocular Movements: Extraocular movements intact.      Pupils: Pupils are equal, round, and reactive to light.   Neck:      Musculoskeletal: Normal range of motion and neck supple.   Cardiovascular:      Rate and Rhythm: Normal rate and regular rhythm.      Pulses: Normal pulses.      Heart sounds: Normal heart sounds.   Pulmonary:      Effort: Pulmonary effort is normal.      Breath sounds: No rales.   Abdominal:      General: Bowel sounds are normal. There is distension (stable).      Palpations: Abdomen is soft.   Musculoskeletal: Normal range of motion.         General: No tenderness.      Comments: mild b/l LE edema, stable   Skin:     General: Skin is dry.      Coloration: Skin is not jaundiced.   Neurological:      General: No focal deficit present.      Mental Status: She is alert. Mental status is at baseline.   Psychiatric:         Mood and Affect: Mood normal.         Fluids    Intake/Output Summary (Last 24 hours) at 10/22/2020 1128  Last data filed at 10/22/2020 0809  Gross per 24 hour   Intake 1300 ml   Output 1150 ml   Net 150 ml       Laboratory  Recent Labs     10/20/20  0200 10/21/20  0121 10/22/20  0100   WBC 10.2  --  10.2   RBC 2.34*  --  2.46*   HEMOGLOBIN 7.3* 7.8* 7.8*   HEMATOCRIT 23.5* 24.7* 25.6*   .4*  --  104.1*   MCH 31.2  --  31.7   MCHC 31.1*  --  30.5*   RDW 53.6*  --  56.0*   PLATELETCT 289  --  337   MPV 9.9  --  9.7     Recent Labs     10/20/20  0200 10/21/20  0121 10/22/20  0100   SODIUM 131* 132*  "135   POTASSIUM 4.4 4.1 4.1   CHLORIDE 94* 94* 97   CO2 32 33 34*   GLUCOSE 76 82 93   BUN 24* 17 13   CREATININE 0.53 0.51 0.52   CALCIUM 9.6 9.4 9.2                   Imaging  IR-PICC LINE PLACEMENT W/ GUIDANCE > AGE 5   Final Result                  Ultrasound-guided PICC placement performed by qualified nursing staff as    above.          DX-UPPER GI-SMALL BOWEL FOLLOW THRU   Final Result      1.  Negative small bowel follow-through. No obstruction      2.  Moderate thoracic esophageal dysmotility and the esophagus is mildly dilated.      DX-CHEST-FOR LINE PLACEMENT Perform procedure in: Patient's Room   Final Result      Left subclavian central line identified with tip in expected location of the superior vena cava.      Left pleural fluid and left basilar consolidation.      OUTSIDE IMAGES-CT ABDOMEN /PELVIS   Final Result      OUTSIDE IMAGES-CT ABDOMEN /PELVIS   Final Result      OUTSIDE IMAGES-DX ABDOMEN   Final Result      [id number] FILM X-RAY   Final Result      OUTSIDE IMAGES-US VASCULAR   Final Result      OUTSIDE IMAGES-DX ABDOMEN   Final Result      NM-GASTRIC EMPTYING    (Results Pending)          Assessment/Plan  * Gastric outflow obstruction- (present on admission)  Assessment & Plan  -  GI onboard; appreciated.  10/19 EGD showed large amount of retained coffee-ground like debris and solid.  Repeat EGD 10/21 showed esophageal reflux, \"large shallow ulcer involving at least 50% hernia sac, no active bleeding, large adherent mucoid clot.\"    - Gastric emptying study planned for today, 10/22.    - Per GI Carafate slurry 1 g 4 times daily x2 weeks minimum.  Continue Protonix 40 mg p.o. twice daily.  Further recs pending gastric emptying study.  - Follow up biopsies.   - Has not needed TPN since 10/17.  Will remove PICC if no further need.     Upper GI bleed- (present on admission)  Assessment & Plan  - With anemia.  Hgb remains stable.  Transfuse 1 unit packed RBCs if Hgb < 7 or patient " "symptomatic.  - GI onboard; appreciated.  S/p EGD 10/21 showed esophageal reflux, \"large shallow ulcer involving at least 50% hernia sac, no active bleeding, large adherent mucoid clot.\"    - Gastric emptying study planned for today, 10/22.    - Per GI Carafate slurry 1 g 4 times daily x2 weeks minimum.  Continue Protonix 40 mg p.o. twice daily.  Further recs pending gastric emptying study.  - Monitor CBC.     Type 2 diabetes mellitus without complication, with long-term current use of insulin (HCC)  Assessment & Plan  - Continue correctional insulin.  - Hypoglycemia protocol.     Gastric mass- (present on admission)  Assessment & Plan  - 10/19 EGD notes possible gastric mass.   - Follow up bx.     Hypothyroidism- (present on admission)  Assessment & Plan  - Continue home levothyroxine.     Recurrent left pleural effusion- (present on admission)  Assessment & Plan  - Noted on outside chest CT and on 10/15 CXR.   - Completed IV antibiotic for aspiration pneumonia at outside facility.   - 10/2020 ECHO w/EF 70%, Pro-BNP < 400.   - Patient saturating well on 1 L nasal cannula.   - Likely secondary to atelectasis from large hiatal hernia.   - Continue IS.    Hypomagnesemia  Assessment & Plan  - Replete PRN.     Aspiration pneumonia (HCC)- (present on admission)  Assessment & Plan  - Completed antibiotic regimen at outside facility.  Further abx has been stopped due to possible gastric outlet obstruction.   - No signs of active infection.  Monitor.    - SLP onboard; appreciated.    Hiatal hernia- (present on admission)  Assessment & Plan  - Patient evaluated by Surgery at outside facility, who recommended transfer to [facility] for Surgery/GI evaluation.   - General Surgery onboard; appreciated.       VTE prophylaxis: Chemical prophylaxis contraindicated due to upper GI bleed.  SCDs are ordered.    I have performed a physical exam and reviewed and updated ROS and Plan today (10/22/20). In review of yesterday's note " (10/21/20), changes are noted above.      Ellie Leung M.D.

## 2020-10-22 NOTE — CARE PLAN
Problem: Communication  Goal: The ability to communicate needs accurately and effectively will improve  Outcome: PROGRESSING AS EXPECTED  Note: Patient educated to call when in need of assistance. Call light within reach. All needs met at this time.      Problem: Safety  Goal: Will remain free from injury  Outcome: PROGRESSING AS EXPECTED  Note: Patient educated on safety and fall risk. Bed is locked and in lowest position. Bed alarm is on.   Goal: Will remain free from falls  Outcome: PROGRESSING AS EXPECTED     Problem: Knowledge Deficit  Goal: Knowledge of disease process/condition, treatment plan, diagnostic tests, and medications will improve  Outcome: PROGRESSING AS EXPECTED  Goal: Knowledge of the prescribed therapeutic regimen will improve  Outcome: PROGRESSING AS EXPECTED     Problem: Pain Management  Goal: Pain level will decrease to patient's comfort goal  Outcome: PROGRESSING AS EXPECTED

## 2020-10-23 VITALS
RESPIRATION RATE: 17 BRPM | DIASTOLIC BLOOD PRESSURE: 73 MMHG | BODY MASS INDEX: 29.17 KG/M2 | TEMPERATURE: 99.4 F | HEART RATE: 89 BPM | SYSTOLIC BLOOD PRESSURE: 122 MMHG | OXYGEN SATURATION: 92 % | WEIGHT: 158.51 LBS | HEIGHT: 62 IN

## 2020-10-23 PROCEDURE — 700111 HCHG RX REV CODE 636 W/ 250 OVERRIDE (IP): Performed by: HOSPITALIST

## 2020-10-23 PROCEDURE — 700102 HCHG RX REV CODE 250 W/ 637 OVERRIDE(OP): Performed by: HOSPITALIST

## 2020-10-23 PROCEDURE — 302146: Performed by: STUDENT IN AN ORGANIZED HEALTH CARE EDUCATION/TRAINING PROGRAM

## 2020-10-23 PROCEDURE — 99239 HOSP IP/OBS DSCHRG MGMT >30: CPT | Performed by: STUDENT IN AN ORGANIZED HEALTH CARE EDUCATION/TRAINING PROGRAM

## 2020-10-23 PROCEDURE — A9270 NON-COVERED ITEM OR SERVICE: HCPCS | Performed by: HOSPITALIST

## 2020-10-23 PROCEDURE — 700102 HCHG RX REV CODE 250 W/ 637 OVERRIDE(OP): Performed by: INTERNAL MEDICINE

## 2020-10-23 PROCEDURE — C9113 INJ PANTOPRAZOLE SODIUM, VIA: HCPCS | Performed by: HOSPITALIST

## 2020-10-23 PROCEDURE — A9270 NON-COVERED ITEM OR SERVICE: HCPCS | Performed by: INTERNAL MEDICINE

## 2020-10-23 RX ORDER — PANTOPRAZOLE SODIUM 40 MG/1
40 TABLET, DELAYED RELEASE ORAL 2 TIMES DAILY
Qty: 60 TAB | Status: SHIPPED
Start: 2020-10-23

## 2020-10-23 RX ORDER — NYSTATIN 100000 [USP'U]/G
POWDER TOPICAL
Qty: 15 G | Status: SHIPPED
Start: 2020-10-23

## 2020-10-23 RX ORDER — SUCRALFATE ORAL 1 G/10ML
1 SUSPENSION ORAL EVERY 6 HOURS
Refills: 3 | Status: SHIPPED
Start: 2020-10-23

## 2020-10-23 RX ADMIN — SUCRALFATE 1 G: 1 SUSPENSION ORAL at 00:27

## 2020-10-23 RX ADMIN — PANTOPRAZOLE SODIUM 40 MG: 40 INJECTION, POWDER, LYOPHILIZED, FOR SOLUTION INTRAVENOUS at 06:13

## 2020-10-23 RX ADMIN — LEVOTHYROXINE SODIUM 100 MCG: 0.05 TABLET ORAL at 06:13

## 2020-10-23 RX ADMIN — SUCRALFATE 1 G: 1 SUSPENSION ORAL at 06:13

## 2020-10-23 RX ADMIN — SUCRALFATE 1 G: 1 SUSPENSION ORAL at 11:49

## 2020-10-23 RX ADMIN — ACETAMINOPHEN 650 MG: 325 TABLET, FILM COATED ORAL at 00:36

## 2020-10-23 RX ADMIN — NYSTATIN: 100000 POWDER TOPICAL at 06:13

## 2020-10-23 ASSESSMENT — PAIN DESCRIPTION - PAIN TYPE: TYPE: ACUTE PAIN

## 2020-10-23 NOTE — PROGRESS NOTES
General Surgery Consult    CHIEF COMPLAINT: GI bleed.     HISTORY OF PRESENT ILLNESS: The patient is a 85 y.o. female, who presents with a GI bleed.  She has been scoped by gastroenterology revealing a large ulcer and a paraesophageal hernia.  She is currently being treated with proton pump inhibitors and a slurry.  General surgery was consulted should surgical intervention become mandatory.     INTERVAL UPDATE: Doing well this morning.  Gastric emptying study reveals severe gastroparesis.    PAST MEDICAL HISTORY:  has a past medical history of Diabetes, DVT (deep venous thrombosis) (HCC), Hyperlipidemia, Hypertension, Osteoarthritis, and Sleep apnea.     PAST SURGICAL HISTORY:  has a past surgical history that includes hip arthroplasty total; knee arthroplasty total; tonsillectomy and adenoidectomy; cholecystectomy (july 2012); knee replacement, total; carpal tunnel release (2/6/2014); gastroscopy-endo (6/12/2014); gastroscopy (N/A, 10/6/2020); gastroscopy (N/A, 10/19/2020); and gastroscopy (N/A, 10/21/2020).     ALLERGIES: No Known Allergies     CURRENT MEDICATIONS:   Home Medications     Reviewed by More Hernandez R.N. (Registered Nurse) on 10/21/20 at 1125  Med List Status: Complete   Medication Last Dose Status   acetaminophen 650 MG TABS  Active   furosemide (LASIX) 20 MG Tab  Active   gabapentin (NEURONTIN) 100 MG tablet  Active   Insulin Glargine (LANTUS SC)  Active   insulin lispro (HUMALOG) 100 UNIT/ML SOLN  Active   levothyroxine (SYNTHROID) 112 MCG Tab  Active   lovastatin (MEVACOR) 20 MG Tab  Active   NIFEdipine SR (PROCARDIA-XL) 30 MG tablet  Active   omeprazole (PRILOSEC) 20 MG delayed-release capsule  Active   oxazepam (SERAX) 10 MG CAPS  Active   potassium chloride (KLOR-CON) 20 MEQ PACK  Active   psyllium (METAMUCIL) 58.12 % Pack  Active                FAMILY HISTORY: History reviewed. No pertinent family history.     SOCIAL HISTORY:   Social History     Tobacco Use   • Smoking status: Never Smoker  "  Substance and Sexual Activity   • Alcohol use: Yes   • Drug use: No   • Sexual activity: Not on file       REVIEW OF SYSTEMS: Comprehensive review of systems was negative    PHYSICAL EXAMINATION:     GENERAL: The patient is in no acute distress.   VITAL SIGNS: /73   Pulse 89   Temp 37.4 °C (99.4 °F) (Temporal)   Resp 17   Ht 1.575 m (5' 2.01\")   Wt 71.9 kg (158 lb 8.2 oz)   SpO2 92%   HEAD AND NECK: Demonstrates symmetric, reactive pupils. Extraocular muscles   are intact. Nares and oropharynx are clear.   NECK: Supple. No adenopathy.  CHEST:No respiratory distress.    CARDIOVASCULAR: Regular rate. The extremities are well perfused.   ABDOMEN: Obese.  Soft.   EXTREMITIES: Examination of the upper and lower extremities demonstrates no cyanosis edema or clubbing.  NEUROLOGIC: Alert & oriented x 3, Normal motor function, Normal sensory function, No focal deficits noted.    LABORATORY VALUES:   Recent Labs     10/21/20  0121 10/22/20  0100   WBC  --  10.2   RBC  --  2.46*   HEMOGLOBIN 7.8* 7.8*   HEMATOCRIT 24.7* 25.6*   MCV  --  104.1*   MCH  --  31.7   MCHC  --  30.5*   RDW  --  56.0*   PLATELETCT  --  337   MPV  --  9.7     Recent Labs     10/21/20  0121 10/22/20  0100   SODIUM 132* 135   POTASSIUM 4.1 4.1   CHLORIDE 94* 97   CO2 33 34*   GLUCOSE 82 93   BUN 17 13   CREATININE 0.51 0.52   CALCIUM 9.4 9.2                IMAGING:   NM-GASTRIC EMPTYING   Final Result      Markedly delayed gastric emptying of solids.            IR-PICC LINE PLACEMENT W/ GUIDANCE > AGE 5   Final Result                  Ultrasound-guided PICC placement performed by qualified nursing staff as    above.          DX-UPPER GI-SMALL BOWEL FOLLOW THRU   Final Result      1.  Negative small bowel follow-through. No obstruction      2.  Moderate thoracic esophageal dysmotility and the esophagus is mildly dilated.      DX-CHEST-FOR LINE PLACEMENT Perform procedure in: Patient's Room   Final Result      Left subclavian central line " identified with tip in expected location of the superior vena cava.      Left pleural fluid and left basilar consolidation.      OUTSIDE IMAGES-CT ABDOMEN /PELVIS   Final Result      OUTSIDE IMAGES-CT ABDOMEN /PELVIS   Final Result      OUTSIDE IMAGES-DX ABDOMEN   Final Result      [id number] FILM X-RAY   Final Result      OUTSIDE IMAGES-US VASCULAR   Final Result      OUTSIDE IMAGES-DX ABDOMEN   Final Result          IMPRESSION AND PLAN:     1.  Gastric ulcer and gastroparesis.    1.  No indication for emergent surgical intervention at this point.    2.  Gastric ulcer and gastroparesis per GI              ___________________________________   Nathaniel Arias M.D.    DD: 10/23/2020 DT: 11:08 AM

## 2020-10-23 NOTE — PROGRESS NOTES
2 RN skin check complete with ANGELICA Weeks.  Devices in place: RUE PICC, SpO2 finger probe, purewick, SCDs.  Sacrum red and blanching. Barrier paste in use.  Mepilex contraindicated as pt is incontinent.  Small scab to right shin and scattered scabs to left shin.  Left heel red and purple. Right heel pink and blanching. Mepilixes to bilateral heels.   Heel float boots in use. Q2h turns in place. Pt seen by wound RN today.

## 2020-10-23 NOTE — WOUND TEAM
[facility]  Inpatient Services  Initial Wound and Skin Care Evaluation    Admission Date: 10/15/2020     Consult Date: 10/22/2020   HPI, PMH, SH: Reviewed    Unit where seen by Wound Team: [id number]     WOUND CONSULT RELATED TO:  Left heel, left ear     Self Report / Pain Level:  7/10       OBJECTIVE:  Heel mepilex on bilateral heels, not floated. waffle overlay on pressure redistribution bed. Patient being turned with pillows.     WOUND TYPE, LOCATION, CHARACTERISTICS (Pressure Injuries: location, stage, POA or date identified)        Wound 10/21/20 Pressure Injury Heel Posterior Left (Active)   Wound Image   10/22/20 1200   Site Assessment Purple 10/22/20 1200   Periwound Assessment Blanchable erythema 10/22/20 1200   Margins Undefined edges;Attached edges 10/22/20 1200   Closure Open to air 10/22/20 1200   Drainage Amount None 10/22/20 1200   Treatments Site care;Offloading 10/22/20 1200   Wound Cleansing Not Applicable 10/22/20 1200   Periwound Protectant Not Applicable 10/22/20 1200   Dressing Cleansing/Solutions Not Applicable 10/22/20 1200   Dressing Options Open to Air 10/22/20 1200   Dressing Change/Treatment Frequency Every Shift, and As Needed 10/22/20 1200   NEXT Weekly Photo (Inpatient Only) 10/28/20 10/22/20 1200   Pressure Injury Stage DTPI 10/22/20 1200   Non-staged Wound Description Not applicable 10/22/20 1200   Wound Length (cm) 1.2 cm 10/22/20 1200   Wound Width (cm) 1.6 cm 10/22/20 1200   Wound Surface Area (cm^2) 1.92 cm^2 10/22/20 1200   Wound Bed Granulation (%) 0 % 10/22/20 1200   Wound Bed Epithelium (%) 0 % 10/22/20 1200   Wound Bed Slough (%) 0 % 10/22/20 1200   Wound Bed Eschar (%) 0 % 10/22/20 1200   Shape irregular 10/22/20 1200   Wound Odor None 10/22/20 1200   Pulses Left;1+;DP 10/22/20 1200   Exposed Structures None 10/22/20 1200           Vascular:    Dorsal Pedal pulses:  left 1+  Posterior tib pulses:   did not palpate    GENOVEVA:      NA    Lab Values:    Lab Results    Component Value Date/Time    WBC 10.2 10/22/2020 01:00 AM    RBC 2.46 (L) 10/22/2020 01:00 AM    HEMOGLOBIN 7.8 (L) 10/22/2020 01:00 AM    HEMATOCRIT 25.6 (L) 10/22/2020 01:00 AM                    Lab Results   Component Value Date/Time    HBA1C 5.6 09/28/2020 02:01 AM           Culture:   Obtained No, not open, does not look infected, Results show NA    INTERVENTIONS BY WOUND TEAM:  Patient recently back to room from gastric emptying study. Assessed the left heel. Deep tissue injury present. Removed heel mepilex because patient qualifies for heel float boot due to being nonambulatory. Placed heel float boots on bilaterally. Assessed the left ear and there was an area of light brown peeling skin. This skin came off easily revealing intact skin. No wound to left ear. Silicone oxygen tubing with attached grey foam ear protectors in use. RN stated that night shift had seen a wound to labia from [facility], no wound could be seen by this RN.     Interdisciplinary consultation: Patient, Bedside RN     EVALUATION: heel float boots offload the heel and pad the rest of the foot and ankle protecting from further injury and allowing the heel DTI to perfuse and heal.     Goals: Steady decrease in wound area and depth weekly.    NURSING PLAN OF CARE ORDERS (X):  Dressing changes: See Dressing Care orders: NA  Skin care: See Skin Care orders: X  Rectal tube care: See Rectal Tube Care orders:        Other orders:    X RN prevent and treatment protocol                       RSKIN:   CURRENTLY IN PLACE (X), APPLIED THIS VISIT (A), ORDERED (O):   Q shift David:  X  Q shift pressure point assessments:  X  Pressure redistribution mattress    X                         Low Airloss                        Bariatric BAM                     Bariatric foam                        Heel float boots    A                 Heel Silicone dressing   X                      Float Heels off Bed with Pillows               Barrier wipes         Barrier  Cream         Barrier paste          Sacral silicone dressing         Silicone O2 tubing  X       Anchorfast         Cannula fixation Device (Tender )          Gray Foam Ear protectors   X        Trach with Optifoam split foam                 Waffle cushion        Waffle Overlay   X      Rectal tube or BMS    Purwick/Condom Cath          Antifungal tx      Interdry          Reposition q 2 hours  X      Up to chair        Ambulate      PT/OT        Dietician        Diabetes Education      PO  X   TF     TPN     NPO   # days   Other        WOUND TEAM PLAN OF CARE (X):   Dressing changes by wound team:          Follow up 1-2 times weekly:  X weekly             Follow up 3 times weekly:                NPWT change 3 times weekly:     Follow up as needed:       Other (explain):     Anticipated discharge plans (X):   LTACH:        SNF/Rehab:   X               Home Care:           Outpatient Wound Center:            Self Care:            Other:

## 2020-10-23 NOTE — PROGRESS NOTES
2 RN skin check complete.   Devices in place . Purewick, nasal cannula, PIV, scd, float heel   Skin assessed under devices . yes    Left heel purple. +redness. Blanching  Sacrum +redness. Blanching  Bruising to rt side abd    The following interventions in place. Waffle mattress in place. mepilex heel protector in place. Float heel boots. q2 repositioning. Barrier cream to sacrum.

## 2020-10-23 NOTE — CARE PLAN
Problem: Safety  Goal: Will remain free from injury  Outcome: PROGRESSING AS EXPECTED  Goal: Will remain free from falls  Outcome: PROGRESSING AS EXPECTED   Updated about POC. Reinforce call light use. Pt acknowledged understanding    Problem: Bowel/Gastric:  Goal: Normal bowel function is maintained or improved  Outcome: PROGRESSING AS EXPECTED  Goal: Will not experience complications related to bowel motility  Outcome: PROGRESSING AS EXPECTED  Tolerating diet. Denies n/v     Problem: Skin Integrity  Goal: Risk for impaired skin integrity will decrease  Outcome: PROGRESSING AS EXPECTED  Q2 turn in place

## 2020-10-23 NOTE — DISCHARGE PLANNING
Agency/Facility Name: [facility]   Spoke To: Inderjit    Outcome: No beds available today. Bed will be available tomorrow.     Agency/Facility Name: [facility]  Outcome: Left voicemail for Leyla regarding bed availability. Requested call back.     @[phone]  Agency/Facility Name: [facility]   Spoke To: Leyla   Outcome: Per Leyla, she anticipates the next open bed will be 11/3.

## 2020-10-23 NOTE — DISCHARGE INSTRUCTIONS
Discharge Instructions per Ellie Leung M.D.    Discharging to skilled nursing facility.     Return to ER if new or worsening symptoms.        Gastroparesis    Gastroparesis is a condition in which food takes longer than normal to empty from the stomach. The condition is usually long-lasting (chronic). It may also be called delayed gastric emptying.  There is no cure, but there are treatments and things that you can do at home to help relieve symptoms. Treating the underlying condition that causes gastroparesis can also help relieve symptoms.  What are the causes?  In many cases, the cause of this condition is not known. Possible causes include:  · A hormone (endocrine) disorder, such as hypothyroidism or diabetes.  · A nervous system disease, such as Parkinson's disease or multiple sclerosis.  · Cancer, infection, or surgery that affects the stomach or vagus nerve. The vagus nerve runs from your chest, through your neck, to the lower part of your brain.  · A connective tissue disorder, such as scleroderma.  · Certain medicines.  What increases the risk?  You are more likely to develop this condition if you:  · Have certain disorders or diseases, including:  ? An endocrine disorder.  ? An eating disorder.  ? Amyloidosis.  ? Scleroderma.  ? Parkinson's disease.  ? Multiple sclerosis.  ? Cancer or infection of the stomach or the vagus nerve.  · Have had surgery on the stomach or vagus nerve.  · Take certain medicines.  · Are female.  What are the signs or symptoms?  Symptoms of this condition include:  · Feeling full after eating very little.  · Nausea.  · Vomiting.  · Heartburn.  · Abdominal bloating.  · Inconsistent blood sugar (glucose) levels on blood tests.  · Lack of appetite.  · Weight loss.  · Acid from the stomach coming up into the esophagus (gastroesophageal reflux).  · Sudden tightening (spasm) of the stomach, which can be painful.  Symptoms may come and go. Some people may not notice any  symptoms.  How is this diagnosed?  This condition is diagnosed with tests, such as:  · Tests that check how long it takes food to move through the stomach and intestines. These tests include:  ? Upper gastrointestinal (GI) series. For this test, you drink a liquid that shows up well on X-rays, and then X-rays will be taken of your intestines.  ? Gastric emptying scintigraphy. For this test, you eat food that contains a small amount of radioactive material, and then scans are taken.  ? Wireless capsule GI monitoring system. For this test, you swallow a pill (capsule) that records information about how foods and fluid move through your stomach.  · Gastric manometry. For this test, a tube is passed down your throat and into your stomach to measure electrical and muscular activity.  · Endoscopy. For this test, a long, thin tube is passed down your throat and into your stomach to check for problems in your stomach lining.  · Ultrasound. This test uses sound waves to create images of inside the body. This can help rule out gallbladder disease or pancreatitis as a cause of your symptoms.  How is this treated?  There is no cure for gastroparesis. Treatment may include:  · Treating the underlying cause.  · Managing your symptoms by making changes to your diet and exercise habits.  · Taking medicines to control nausea and vomiting and to stimulate stomach muscles.  · Getting food through a feeding tube in the hospital. This may be done in severe cases.  · Having surgery to insert a device into your body that helps improve stomach emptying and control nausea and vomiting (gastric neurostimulator).  Follow these instructions at home:  · Take over-the-counter and prescription medicines only as told by your health care provider.  · Follow instructions from your health care provider about eating or drinking restrictions. Your health care provider may recommend that you:  ? Eat smaller meals more often.  ? Eat low-fat foods.  ? Eat  low-fiber forms of high-fiber foods. For example, eat cooked vegetables instead of raw vegetables.  ? Have only liquid foods instead of solid foods. Liquid foods are easier to digest.  · Drink enough fluid to keep your urine pale yellow.  · Exercise as often as told by your health care provider.  · Keep all follow-up visits as told by your health care provider. This is important.  Contact a health care provider if you:  · Notice that your symptoms do not improve with treatment.  · Have new symptoms.  Get help right away if you:  · Have severe abdominal pain that does not improve with treatment.  · Have nausea that is severe or does not go away.  · Cannot drink fluids without vomiting.  Summary  · Gastroparesis is a chronic condition in which food takes longer than normal to empty from the stomach.  · Symptoms include nausea, vomiting, heartburn, abdominal bloating, and loss of appetite.  · Eating smaller portions, and low-fat, low-fiber foods may help you manage your symptoms.  · Get help right away if you have severe abdominal pain.  This information is not intended to replace advice given to you by your health care provider. Make sure you discuss any questions you have with your health care provider.  Document Released: 12/18/2006 Document Revised: 03/18/2019 Document Reviewed: 10/23/2018  [employer] Patient Education © 2020 [employer]      Discharge Instructions    Discharged to [facility] by medical transportation with escort. Discharged via wheelchair, hospital escort: Yes.  Special equipment needed: Wheelchair    Be sure to schedule a follow-up appointment with your primary care doctor or any specialists as instructed.     Discharge Plan:   Influenza Vaccine Indication: Indicated: 65 years and older  Influenza Vaccine Given - only chart on this line when given: Influenza Vaccine Given (See MAR)    I understand that a diet low in cholesterol, fat, and sodium is recommended for good health. Unless I have been  given specific instructions below for another diet, I accept this instruction as my diet prescription.   Other diet: as tolerated    Special Instructions: None    · Is patient discharged on Warfarin / Coumadin?   No     Depression / Suicide Risk    As you are discharged from this [facility] facility, it is important to learn how to keep safe from harming yourself.    Recognize the warning signs:  · Abrupt changes in personality, positive or negative- including increase in energy   · Giving away possessions  · Change in eating patterns- significant weight changes-  positive or negative  · Change in sleeping patterns- unable to sleep or sleeping all the time   · Unwillingness or inability to communicate  · Depression  · Unusual sadness, discouragement and loneliness  · Talk of wanting to die  · Neglect of personal appearance   · Rebelliousness- reckless behavior  · Withdrawal from people/activities they love  · Confusion- inability to concentrate     If you or a loved one observes any of these behaviors or has concerns about self-harm, here's what you can do:  · Talk about it- your feelings and reasons for harming yourself  · Remove any means that you might use to hurt yourself (examples: pills, rope, extension cords, firearm)  · Get professional help from the community (Mental Health, Substance Abuse, psychological counseling)  · Do not be alone:Call your Safe Contact- someone whom you trust who will be there for you.  · Call your local CRISIS HOTLINE 282-6401 or 627-638-3177  · Call your local Children's Mobile Crisis Response Team [place] (597) 424-6805 or www.[employer]  · Call the toll free National Suicide Prevention Hotlines   · National Suicide Prevention Lifeline 048-724-SLRV (9371)  · National Hope Line Network 800-SUICIDE (031-6535)

## 2020-10-23 NOTE — PROGRESS NOTES
Discharging patient to [facility] per physician order with [facility]. Report given to Steven, receiving RN at 1300. Demonstrated understanding of discharge instructions, follow up appointments, medications. Verbalized understanding of discharge instructions and educational handouts.  Stated several reasons why to return to ED or seek medical attention. All questions answered.  All belongings with patient at time of discharge.

## 2020-10-23 NOTE — DISCHARGE PLANNING
Anticipated Discharge Disposition: SNF    Action: Per MD, this patient is medically cleared for transfer today to SNF. LSW met with the patient to review the list of accepting facilities. The patient reported her preferred location is [place]; however, since [place] does not have any beds, her 2nd choice is [place] based on location.    Per ANGELICA Lyon, this patient is able to transfer via Wheelchair Van, LSW faxed the Transfer Form to [facility]. Per [facility], the transfer to [place] is scheduled for today at 2:00pm.    LSW copied the patient's chart. LSW met with the patient to explained the transfer arrangements, patient agreed, [employer] Form signed.     2nd IMM for Medicare provided and recorded by KAROLINAW.     PASRR in File # [id number].    LSW provided the above information to the patient and ANGELICA Lyon.    Barriers to Discharge: None.    Plan: SNF.

## 2020-10-23 NOTE — PROGRESS NOTES
Assumed care at 1845. Pt resting in bed. A&ox 4  Tolerating diet. No n/v  Denies pain  Incontinent of bladder and bowel. purewick in place  Pt is a max assist. q2 turn in place. Waffle mattress in place  O2 on 1LNC  PIV started. Will draw am labs then dc picc line  Bed alarm in place. Call light within reach. Hourly rounding in place

## 2020-10-23 NOTE — DISCHARGE SUMMARY
"Discharge Summary    CHIEF COMPLAINT ON ADMISSION  No chief complaint on file.      Reason for Admission  Gastric obstruction     CODE STATUS  DNAR/DNI    HPI & HOSPITAL COURSE  This is a 85 y.o. female transferred from [facility] for potential gastric outlet obstruction secondary to hiatal hernia, requiring TPN.  GI was consulted.  Small bowel follow-through did not show signs of obstruction.  Patient was started on liquid diet and TPN was ultimately tapered off.  She underwent EGD on 10/19/2020, which showed large amount of coffee-ground-like debris and solid vegetable matter, abnormal fundic mucosa with questionable mass.  Biopsies were taken.  Repeat EGD performed on 10/21/2020 showed gastroesophageal reflux, \"large shallow ulcer involving at least 50% hernia sac, no active bleeding, large adherent mucoid clot.\"  Biopsies were taken of the ulcer.  Subsequently gastric emptying study was performed, which showed significantly delayed emptying.  However, per GI this may not be gastroparesis as a gastric emptying study should be at least 4 hours in length and this was only 90 minutes.  Per GI recommendations, patient was advanced to full liquid diet, which can be advanced as tolerated.  She is to continue her pantoprazole 40 mg twice daily, Carafate 1 g 4 times daily for 2 weeks.  She is to follow-up with GI in outpatient clinic as scheduled by GI.      Patient was evaluated by PT/OT, who recommended skilled nursing facility for postacute PT/OT prior to returning to her assisted living facility at the [facility].  This was discussed at length with the patient's family (granddaughter).     Therefore, she is discharged in good and stable condition to skilled nursing facility.    The patient met 2-midnight criteria for an inpatient stay at the time of discharge.      FOLLOW UP ITEMS POST DISCHARGE  Follow-up with PCP within 1 week of discharge from skilled nursing facility.  Follow up gastric biopsies.    Follow up with " GI as scheduled by GI team. Follow up gastric biopsies.      DISCHARGE DIAGNOSES  Principal Problem:    Gastric outflow obstruction POA: Yes  Active Problems:    Upper GI bleed POA: Yes    Gastric mass POA: Yes    Type 2 diabetes mellitus without complication, with long-term current use of insulin (HCC) POA: Unknown    Hiatal hernia POA: Yes    Aspiration pneumonia (HCC) POA: Yes    Hypomagnesemia POA: Unknown    Recurrent left pleural effusion POA: Yes    Hypothyroidism POA: Yes  Resolved Problems:    * No resolved hospital problems. *      FOLLOW UP  Davie Mena M.D.  [id number]  [place] [address]  [phone]    In 1 week      Priscilla Egan M.D.  [address] [place] [id number] [phone]      As scheduled by GI.       MEDICATIONS ON DISCHARGE     Medication List      START taking these medications      Instructions   nystatin powder  Commonly known as: MYCOSTATIN   Apply to abdominal folds.     pantoprazole 40 MG Tbec  Commonly known as: PROTONIX   Take 1 Tab by mouth 2 times a day.  Dose: 40 mg     sucralfate 1 GM/10ML Susp  Commonly known as: CARAFATE   Take 10 mL by mouth every 6 hours.  Dose: 1 g        CONTINUE taking these medications      Instructions   Acetaminophen 650 MG Tabs   Take 650 mg by mouth every four hours as needed for Fever or Mild Pain (Temp greater than 100.5 F or Mild Pain (1-3)).  Dose: 650 mg     gabapentin 100 MG tablet  Commonly known as: NEURONTIN   Take 200 mg by mouth every day.  Dose: 200 mg     levothyroxine 112 MCG Tabs  Commonly known as: SYNTHROID   Take 112 mcg by mouth Every morning on an empty stomach.  Dose: 112 mcg     lovastatin 20 MG Tabs  Commonly known as: MEVACOR   Take 20 mg by mouth every evening.  Dose: 20 mg     psyllium 58.12 % Pack  Commonly known as: METAMUCIL   Take 1 Packet by mouth every day.  Dose: 1 Packet        STOP taking these medications    furosemide 20 MG Tabs  Commonly known as: LASIX     insulin lispro 100 UNIT/ML  Commonly  known as: HumaLOG     LANTUS SC     NIFEdipine SR 30 MG tablet  Commonly known as: PROCARDIA-XL     omeprazole 20 MG delayed-release capsule  Commonly known as: PRILOSEC     oxazepam 10 MG Caps  Commonly known as: SERAX     potassium chloride 20 MEQ Pack  Commonly known as: KLOR-CON            Allergies  No Known Allergies    DIET  Orders Placed This Encounter   Procedures   • Diet Order Full Liquid     Standing Status:   Standing     Number of Occurrences:   1     Order Specific Question:   Diet:     Answer:   Full Liquid [11]       ACTIVITY  As tolerated and directed by skilled nursing.  Weight bearing as tolerated    LINES, DRAINS, AND WOUNDS  This is an automated list. Peripheral IVs will be removed prior to discharge.  Peripheral IV 10/22/20 20 G Left Wrist (Active)   Site Assessment Clean;Dry;Intact 10/23/20 0915   Dressing Type Transparent 10/23/20 0915   Line Status Flushed;Scrubbed the hub prior to access;Saline locked 10/23/20 0915   Dressing Status Clean;Dry;Intact 10/23/20 0915   Dressing Intervention N/A 10/23/20 0915   Dressing Change Due 10/29/20 10/23/20 0915   Infiltration Grading ([facility], [facility]) 0 10/23/20 0915   Phlebitis Scale ([facility] Only) 0 10/23/20 0915       Peripheral IV 10/22/20 20 G Right Forearm (Active)   Site Assessment Clean;Dry;Intact 10/23/20 0915   Dressing Type Transparent 10/23/20 0915   Line Status Scrubbed the hub prior to access;Saline locked;Flushed 10/23/20 0915   Dressing Status Clean;Dry;Intact 10/23/20 0915   Dressing Intervention N/A 10/23/20 0915   Dressing Change Due 10/29/20 10/23/20 0915   Infiltration Grading ([facility], [facility]) 0 10/23/20 0915   [facility]) 0 10/23/20 0915     External Urinary Catheter ([facility]) (Active)   Collection Container Suction container 10/21/20 2000   Output (mL) 100 mL 10/23/20 0840      Wound 10/21/20 Pressure Injury Heel Posterior Left (Active)   Wound Image   10/22/20 1200   Site Assessment Purple;Red 10/23/20 0915    Periwound Assessment Blanchable erythema 10/23/20 0915   Margins Undefined edges;Attached edges 10/22/20 1200   Closure Open to air 10/23/20 0915   Drainage Amount None 10/22/20 2300   Treatments Offloading 10/23/20 0915   Wound Cleansing Not Applicable 10/22/20 1200   Periwound Protectant Not Applicable 10/22/20 1200   Dressing Cleansing/Solutions Not Applicable 10/22/20 1200   Dressing Options Open to Air 10/23/20 0915   Dressing Change/Treatment Frequency Every Shift, and As Needed 10/22/20 1200   NEXT Weekly Photo (Inpatient Only) 10/28/20 10/22/20 1200   Pressure Injury Stage DTPI 10/22/20 1200   Non-staged Wound Description Not applicable 10/22/20 1200   Wound Length (cm) 1.2 cm 10/22/20 1200   Wound Width (cm) 1.6 cm 10/22/20 1200   Wound Surface Area (cm^2) 1.92 cm^2 10/22/20 1200   Wound Bed Granulation (%) 0 % 10/22/20 1200   Wound Bed Epithelium (%) 0 % 10/22/20 1200   Wound Bed Slough (%) 0 % 10/22/20 1200   Wound Bed Eschar (%) 0 % 10/22/20 1200   Shape irregular 10/22/20 1200   Wound Odor None 10/22/20 1200   Pulses Left;1+;DP 10/22/20 1200   Exposed Structures None 10/22/20 1200   WOUND NURSE ONLY - Time Spent with Patient (mins) 45 10/22/20 1200       Peripheral IV 10/22/20 20 G Left Wrist (Active)   Site Assessment Clean;Dry;Intact 10/23/20 0915   Dressing Type Transparent 10/23/20 0915   Line Status Flushed;Scrubbed the hub prior to access;Saline locked 10/23/20 0915   Dressing Status Clean;Dry;Intact 10/23/20 0915   Dressing Intervention N/A 10/23/20 0915   Dressing Change Due 10/29/20 10/23/20 0915   Infiltration Grading ([facility]) 0 10/23/20 0915   Phlebitis Scale ([facility] Only) 0 10/23/20 0915       Peripheral IV 10/22/20 20 G Right Forearm (Active)   Site Assessment Clean;Dry;Intact 10/23/20 0915   Dressing Type Transparent 10/23/20 0915   Line Status Scrubbed the hub prior to access;Saline locked;Flushed 10/23/20 0915   Dressing Status Clean;Dry;Intact 10/23/20 0915   Dressing  "Intervention N/A 10/23/20 0915   Dressing Change Due 10/29/20 10/23/20 0915   Infiltration Grading ([facility] 0 10/23/20 0915   Phlebitis Scale ([facility] Only) 0 10/23/20 0915               MENTAL STATUS ON TRANSFER  Level of Consciousness: Alert  Orientation : Oriented x 4  Speech: Speech Clear    CONSULTATIONS  General Surgery  GI    PROCEDURES  10/16/2020 - GI small bowel follow-through  \"IMPRESSION:     1.  Negative small bowel follow-through. No obstruction     2.  Moderate thoracic esophageal dysmotility and the esophagus is mildly dilated.\"    10/22/2020 - Gastric emptying study  \"IMPRESSION:   Markedly delayed gastric emptying of solids.\"    10/19/2020 - EGD  \"Findings:   Esoph:  Coffee ground debris refluxing into esophagus  Stomach: several hundred cc's of coffee grounds material with vegetable matter.  Limited visualization of mucosa but appears to be large ulcerated area lesser curvature of fundus with mucoid mass.  Mucosa is firm and difficult to biopsy.  Duod:  4-5cm diverticulum superior aspect of second portion of duodenum\"    10/21/2020 - Repeat EGD   \"Findings:   Esophagus:  No mucosal abnormality but gastric contents noted to be refluxing into esoph  Stomach:  GE junction at 30 cm, diaphragmatic hiatus at 36cm.  Large shallow ulcer involving at least 50% hernia sac, no active bleeding, large adherent mucoid clot.  Tried to remove mucoid clot with snare but only able to partially remove.  Multiple biopsies taken of ulcer.  Duodenum: normal\"       LABORATORY  Lab Results   Component Value Date    SODIUM 135 10/22/2020    POTASSIUM 4.1 10/22/2020    CHLORIDE 97 10/22/2020    CO2 34 (H) 10/22/2020    GLUCOSE 93 10/22/2020    BUN 13 10/22/2020    CREATININE 0.52 10/22/2020        Lab Results   Component Value Date    WBC 10.2 10/22/2020    HEMOGLOBIN 7.8 (L) 10/22/2020    HEMATOCRIT 25.6 (L) 10/22/2020    PLATELETCT 337 10/22/2020        Total time of the discharge process: 42 minutes.   "

## 2020-10-23 NOTE — PROGRESS NOTES
AA&Ox4.   SpO2 >90% on 1L via NC. Denies SOB.  Denies any pain.  Tolerating full liquid diet. Denies N/V.  + void.   LBM 10/21.   Pt up to chair with 1-2x assist.   All needs met at this time. Call light within reach. Pt calls appropriately.

## 2020-10-23 NOTE — DISCHARGE PLANNING
Agency/Facility Name: [facility]   Outcome: Received voicemail from Inderjit stating a bed is available today. [facility] returned call and left voicemail requesting call back.     [email or web address]  Agency/Facility Name: [facility]   Spoke To: Inderjit   Outcome: Transportation arranged by facility for 10/23 for 1400.   LSW notified.

## 2020-11-11 ENCOUNTER — HOSPITAL ENCOUNTER (OUTPATIENT)
Dept: RADIOLOGY | Facility: MEDICAL CENTER | Age: 85
End: 2020-11-11
Payer: MEDICARE

## 2020-11-28 ENCOUNTER — HOSPITAL ENCOUNTER (OUTPATIENT)
Facility: MEDICAL CENTER | Age: 85
End: 2020-11-28
Payer: COMMERCIAL

## 2020-11-29 LAB
COVID ORDER STATUS COVID19: NORMAL
SARS-COV-2 RNA RESP QL NAA+PROBE: DETECTED
SPECIMEN SOURCE: ABNORMAL

## 2022-11-03 NOTE — PROGRESS NOTES
Pt is A&O 4  Pain increases with movement  Denies nausea  Currently tolerating full liquid diabetic diet with 1:1 supervision  + Voids per car d/t incontinence  + flatus  - BM  Up max assist, pt up to chair this afternoon, required full assistance back to bed, pt unable to assist with stand pivot at this time.   Pt does demonstrate ability to feed self  SCD's on  Edema 1+ bilaterally to lower extremities, SCD's in place  Bed alarm on, pt high fall risk per breanna paul  Reviewed plan of care with patient, bed in lowest position and locked, pt resting comfortably now, call light within reach, all needs met at this time. Interventions will be executed per plan of care   Aklief counseling:  Patient advised to apply a pea-sized amount only at bedtime and wait 30 minutes after washing their face before applying.  If too drying, patient may add a non-comedogenic moisturizer.  The most commonly reported side effects including irritation, redness, scaling, dryness, stinging, burning, itching, and increased risk of sunburn.  The patient verbalized understanding of the proper use and possible adverse effects of retinoids.  All of the patient's questions and concerns were addressed.

## (undated) DEVICE — FILM CASSETTE ENDO

## (undated) DEVICE — CONTAINER, SPECIMEN, STERILE

## (undated) DEVICE — CAPTIVATOR II-15MM ROUND STIFF

## (undated) DEVICE — FORCEP RADIAL JAW 4 STANDARD CAPACITY W/NEEDLE 240CM (40EA/BX)

## (undated) DEVICE — BLOCK BITE ENDOSCOPIC 2809 - (100/BX) INTERMEDIATE

## (undated) DEVICE — KIT CUSTOM PROCEDURE SINGLE FOR ENDO  (15/CA)

## (undated) DEVICE — BITE BLOCK ADULT 60FR (100EA/CA)